# Patient Record
Sex: FEMALE
[De-identification: names, ages, dates, MRNs, and addresses within clinical notes are randomized per-mention and may not be internally consistent; named-entity substitution may affect disease eponyms.]

---

## 2023-09-19 ENCOUNTER — APPOINTMENT (OUTPATIENT)
Dept: PAIN MANAGEMENT | Facility: CLINIC | Age: 68
End: 2023-09-19
Payer: MEDICARE

## 2023-09-19 VITALS — WEIGHT: 170 LBS | HEIGHT: 65 IN | BODY MASS INDEX: 28.32 KG/M2

## 2023-09-19 DIAGNOSIS — Z86.39 PERSONAL HISTORY OF OTHER ENDOCRINE, NUTRITIONAL AND METABOLIC DISEASE: ICD-10-CM

## 2023-09-19 DIAGNOSIS — M25.511 PAIN IN RIGHT SHOULDER: ICD-10-CM

## 2023-09-19 DIAGNOSIS — M25.512 PAIN IN RIGHT SHOULDER: ICD-10-CM

## 2023-09-19 PROBLEM — Z00.00 ENCOUNTER FOR PREVENTIVE HEALTH EXAMINATION: Status: ACTIVE | Noted: 2023-09-19

## 2023-09-19 PROCEDURE — 99203 OFFICE O/P NEW LOW 30 MIN: CPT

## 2023-09-19 RX ORDER — GABAPENTIN 300 MG/1
300 CAPSULE ORAL 3 TIMES DAILY
Qty: 90 | Refills: 0 | Status: ACTIVE | COMMUNITY
Start: 2023-09-19 | End: 1900-01-01

## 2023-09-27 ENCOUNTER — APPOINTMENT (OUTPATIENT)
Dept: MRI IMAGING | Facility: CLINIC | Age: 68
End: 2023-09-27
Payer: MEDICARE

## 2023-09-27 PROCEDURE — 72148 MRI LUMBAR SPINE W/O DYE: CPT

## 2023-10-03 ENCOUNTER — APPOINTMENT (OUTPATIENT)
Dept: PAIN MANAGEMENT | Facility: CLINIC | Age: 68
End: 2023-10-03
Payer: MEDICARE

## 2023-10-03 VITALS — BODY MASS INDEX: 28.32 KG/M2 | HEIGHT: 65 IN | WEIGHT: 170 LBS

## 2023-10-03 DIAGNOSIS — M54.17 RADICULOPATHY, LUMBOSACRAL REGION: ICD-10-CM

## 2023-10-03 PROCEDURE — 99213 OFFICE O/P EST LOW 20 MIN: CPT

## 2023-12-05 ENCOUNTER — APPOINTMENT (OUTPATIENT)
Dept: PAIN MANAGEMENT | Facility: CLINIC | Age: 68
End: 2023-12-05
Payer: MEDICARE

## 2023-12-05 VITALS — HEIGHT: 65 IN | WEIGHT: 170 LBS | BODY MASS INDEX: 28.32 KG/M2

## 2023-12-05 DIAGNOSIS — M19.012 PRIMARY OSTEOARTHRITIS, RIGHT SHOULDER: ICD-10-CM

## 2023-12-05 DIAGNOSIS — M19.011 PRIMARY OSTEOARTHRITIS, RIGHT SHOULDER: ICD-10-CM

## 2023-12-05 DIAGNOSIS — M25.819 OTHER SPECIFIED JOINT DISORDERS, UNSPECIFIED SHOULDER: ICD-10-CM

## 2023-12-05 PROCEDURE — 99213 OFFICE O/P EST LOW 20 MIN: CPT

## 2023-12-05 RX ORDER — MELOXICAM 15 MG/1
15 TABLET ORAL
Qty: 30 | Refills: 1 | Status: ACTIVE | COMMUNITY
Start: 2023-12-05 | End: 1900-01-01

## 2025-06-19 ENCOUNTER — INPATIENT (INPATIENT)
Facility: HOSPITAL | Age: 70
LOS: 4 days | Discharge: HOME HEALTH | End: 2025-06-24
Attending: STUDENT IN AN ORGANIZED HEALTH CARE EDUCATION/TRAINING PROGRAM | Admitting: STUDENT IN AN ORGANIZED HEALTH CARE EDUCATION/TRAINING PROGRAM
Payer: MEDICARE

## 2025-06-19 VITALS
RESPIRATION RATE: 16 BRPM | TEMPERATURE: 98 F | SYSTOLIC BLOOD PRESSURE: 153 MMHG | WEIGHT: 164.91 LBS | HEIGHT: 65 IN | OXYGEN SATURATION: 100 % | HEART RATE: 82 BPM | DIASTOLIC BLOOD PRESSURE: 73 MMHG

## 2025-06-19 LAB
APTT BLD: 29.9 SEC — SIGNIFICANT CHANGE UP (ref 26.1–36.8)
INR BLD: 0.97 RATIO — SIGNIFICANT CHANGE UP (ref 0.85–1.16)
PROTHROM AB SERPL-ACNC: 11.2 SEC — SIGNIFICANT CHANGE UP (ref 9.9–13.4)

## 2025-06-19 RX ORDER — ACETAMINOPHEN 500 MG/5ML
1000 LIQUID (ML) ORAL ONCE
Refills: 0 | Status: COMPLETED | OUTPATIENT
Start: 2025-06-19 | End: 2025-06-19

## 2025-06-19 RX ORDER — MAGNESIUM, ALUMINUM HYDROXIDE 200-200 MG
30 TABLET,CHEWABLE ORAL ONCE
Refills: 0 | Status: COMPLETED | OUTPATIENT
Start: 2025-06-19 | End: 2025-06-19

## 2025-06-19 RX ADMIN — Medication 30 MILLILITER(S): at 23:16

## 2025-06-19 RX ADMIN — Medication 400 MILLIGRAM(S): at 23:21

## 2025-06-19 RX ADMIN — Medication 20 MILLIGRAM(S): at 23:16

## 2025-06-19 NOTE — ED ADULT NURSE NOTE - OBJECTIVE STATEMENT
Pt arrives to room 27, A&Ox4, ambulatory. PMHx of DM2, HTN, HLD. Pt C/O CP, SOB worse upon ambulation, HA, and right sided body pain x months. Respirations are even and unlabored, NSR on monitor, capillary refill is 3 seconds bilaterally. 20G IV placed in right AC, labs sent. medicated as per MD ordered. bed in lowest position, comfort measures provided, safety maintained.

## 2025-06-19 NOTE — ED PROVIDER NOTE - PHYSICAL EXAMINATION
Const: Awake, alert, no acute distress.  Well appearing.  Moving comfortably on stretcher.  HEENT: NC/AT.  Moist mucous membranes.  No pharyngeal erythema, no exudates.  Eyes: Extraocular movements intact b/l.  Pupils equal, round, and reactive to light b/l.  Conjunctiva pink.  No scleral icterus.  Neck: Neck supple, full ROM without pain.  Cardiac: Regular rate and regular rhythm. S1 S2 present.  Peripheral pulses 2+ and symmetric.  No LE edema.  No chest wall tenderness, no overlying skin changes.  Resp: Speaking in full sentences. No evidence of respiratory distress.  Good air entry b/l, breath sounds clear to auscultation b/l.  Abd: Non-distended, no overlying skin changes.  Soft, (+) mild RUQ tenderness.  no guarding, no rigidity, no rebound tenderness.  No palpable masses.  MSK: Spine midline and non-tender, no paraspinal tenderness, no palpable deformities or overlying skin changes or open wounds. No CVAT.  Skin: Normal coloration.  No rashes, abrasions or lacerations.  Neuro: Awake, alert & oriented x 3.  Moves all extremities spontaneously and symmetrically.  No focal deficits.

## 2025-06-19 NOTE — ED ADULT TRIAGE NOTE - BEFAST SCREENING
Subjective   Patient ID: Valerie is a 43 year old female.    Chief Complaint   Patient presents with    Office Visit    Back Pain     For the last 3 days she has had low back pain that radiates to the left side under rib cage. She sees a pulmonologist for a left lung issue. The pain makes her lightheaded.      HPI  43 year old year-old female who presents with chief complaints of bilateral flank pain and mid back pain with pain wrapping around into her left upper quadrant area underneath the left rib cage.    Past Medical History:   Diagnosis Date    Asthma (CMD)     Hernia, inguinal     Hernia, umbilical     Influenza with pneumonia 11/2022       MEDICATIONS:  Current Outpatient Medications   Medication Sig    albuterol 108 (90 Base) MCG/ACT inhaler Inhale 2 puffs into the lungs every 4 hours as needed for Shortness of Breath or Wheezing.    acetaminophen (TYLENOL) 325 MG tablet Take 2 tablets by mouth every 6 hours as needed for Pain. (Patient not taking: Reported on 9/18/2024)    MAGNESIUM OXIDE PO STOP NOW (Patient not taking: Reported on 11/25/2024)    BLACK CURRANT SEED OIL PO STOP NOW (Patient not taking: Reported on 11/25/2024)    VITAMIN D PO Pt instructed to stop now (Patient not taking: Reported on 11/25/2024)    BIOTIN PO Take by mouth daily. Pt instructed to stop now (Patient not taking: Reported on 11/25/2024)     No current facility-administered medications for this visit.       ALLERGIES:  ALLERGIES:  No Known Allergies    PAST SURGICAL HISTORY:  Past Surgical History:   Procedure Laterality Date    Appendectomy      Esophagogastroduodenoscopy transoral flex diag      Open access colonoscopy         FAMILY HISTORY:  No family history on file.    SOCIAL HISTORY:  Social History     Tobacco Use    Smoking status: Never     Passive exposure: Never    Smokeless tobacco: Never   Vaping Use    Vaping status: never used   Substance Use Topics    Alcohol use: Not Currently    Drug use: Never          Patient's medications, allergies, past medical, surgical, and social history including nursing input notes were reviewed and updated as appropriate.    Review of Systems   Constitutional: Negative.    HENT: Negative.     Eyes: Negative.    Respiratory: Negative.     Cardiovascular: Negative.    Gastrointestinal:  Positive for abdominal pain.   Endocrine: Negative.    Genitourinary:  Positive for flank pain.   Musculoskeletal:  Positive for back pain.   Skin: Negative.    Neurological:  Positive for light-headedness.   Hematological: Negative.    All other systems reviewed and are negative.    See HPI for pertinent positives, otherwise all other review of system are negative     Objective   Physical Exam  Vitals and nursing note reviewed.   Constitutional:       Appearance: Normal appearance.   HENT:      Head: Normocephalic and atraumatic.      Right Ear: Tympanic membrane, ear canal and external ear normal.      Left Ear: Tympanic membrane, ear canal and external ear normal.      Nose: Nose normal.      Mouth/Throat:      Mouth: Mucous membranes are moist.      Pharynx: Oropharynx is clear.   Eyes:      Extraocular Movements: Extraocular movements intact.      Conjunctiva/sclera: Conjunctivae normal.      Pupils: Pupils are equal, round, and reactive to light.   Cardiovascular:      Rate and Rhythm: Normal rate and regular rhythm.      Pulses: Normal pulses.      Heart sounds: Normal heart sounds.   Pulmonary:      Effort: Pulmonary effort is normal.      Breath sounds: Normal breath sounds.   Abdominal:      General: Abdomen is flat. Bowel sounds are normal.      Palpations: Abdomen is soft.      Tenderness: There is abdominal tenderness in the left upper quadrant. There is no right CVA tenderness or left CVA tenderness.      Comments: Bilateral flank tenderness.   Musculoskeletal:         General: Normal range of motion.   Skin:     General: Skin is warm and dry.   Neurological:      General: No focal  deficit present.      Mental Status: She is alert and oriented to person, place, and time. Mental status is at baseline.   Psychiatric:         Mood and Affect: Mood normal.         Behavior: Behavior normal.         Thought Content: Thought content normal.         Judgment: Judgment normal.         Visit Vitals  /79   Pulse 88   Temp 96.4 °F (35.8 °C)   Resp 16   LMP 08/21/2023 (Exact Date)   SpO2 99%         Assessment   Problem List Items Addressed This Visit    None  Visit Diagnoses       Bilateral flank pain    -  Primary    Abdominal pain, LUQ (left upper quadrant)        Relevant Orders    POCT Urine Dip Auto (Completed)            Results for orders placed or performed in visit on 11/25/24   POCT Urine Dip Auto   Result Value Ref Range    POCT Color Yellow Patricia, Brown, Orange, Pink, Red, Straw, Yellow, Green, Blue, Colorless, Other    POCT Appearance Clear Clear, Cloudy, Hazy, Turbid    POCT Glucose Urine Negative Negative mg/dL    POCT Bilirubin Negative Negative    POCT Ketones Negative Negative mg/dL    POCT Specific Gravity 1.020 1.000, 1.005, 1.010, 1.015, 1.020, 1.025, 1.030, <= 1.005    POCT Occult Blood Negative Negative    POCT pH 7.0 5.0, 5.5, 6.0, 6.5, 7.0, 7.5, 8.0, 8.5    POCT Protein Negative Negative mg/dL    POCT Urobilinogen 0.2 0.2, 1.0 mg/dL    Urine Nitrite Negative Negative    WBC (Leukocyte) Esterase POC Negative Negative        MEDICAL DECISION MAKING:   [facility]      This is a 43 year old year-old female who presents with chief complaints of bilateral flank pain and mid back pain with pain wrapping around into her left upper quadrant area underneath the left rib cage..  The symptoms have been going on for the last 3 days..  Patient complains of fatigue and tiredness.  She also reports feeling lightheaded and dizzy as well.  She denies any fever or chills.  Denies any chest pain or shortness of breath.  She also reports some chest congestion described as chest tightness.  But denies  any significant cough or phlegm.  Denies any shortness of breath.  She denies any urinary symptoms of dysuria frequency urgency hematuria.  Patient cannot recall any specific etiology.  Denies any back injury.  Denies any abdominal injury.  She does have a history of asthma.  She has a history of tubal ligation as well.  Vitals are stable  On exam  no acute distress.  No ill appearance.  Patient does not appear to be in pain.  Lungs are clear to auscultation.  No tachycardia.  Patient exhibits tenderness in the left upper quadrant and the suprapubic area.  There is no obvious CVA tenderness bilaterally.  No significant thoracolumbar muscle spasms.  UA: Negative for white blood cells, negative nitrate, negative protein, negative blood, clear in appearance  Based on patient's clinical presentation she likely needs a CT of the abdomen pelvis with contrast to further investigate her flank pain and abdominal pain.  Due to lack of CT at immediate care recommend further evaluation at the emergency room.  Patient plans to go to [facility].  She stable to go by car.  Patient was discharged from clinic in stable condition  Instructions provided as documented in the AVS.     Negative

## 2025-06-19 NOTE — ED PROVIDER NOTE - ATTENDING CONTRIBUTION TO CARE
69 y/o F with h/o HTN, DM, hyperlipidemia, CAD s/p ?stents x 2 p/w exertional SOB and CP x 6 months.  Never sxs at rest, but reports persistent and worsening exertional sxs.  She has been following with her outpatient PMH Dr Olivares, but has never reported these sxs.  No fever, chills, back pain, cough, uri sxs, leg pain or swelling, n/v, abd pain.    Well appearing, lying comfortably in stretcher, awake and alert, nontoxic.  AF/VSS.  Lungs cta bl.  Cards nl S1/S2, RRR, no MRG.  Abd soft ntnd.  No pedal edema or calf tenderness.    High risk cardiac pt with sxs concerning for angina - no sxs at rest or currently.  Will obtain ekg, labs, cxr, cdu vs admission for provocative testing and cards eval.

## 2025-06-19 NOTE — ED PROVIDER NOTE - CLINICAL SUMMARY MEDICAL DECISION MAKING FREE TEXT BOX
Jun LYNN), PGY-2:   This is a 70-year-old F patient with history HTN, HLD, DM, CAD with stents, on baby aspirin, presenting to the ED for evaluation of chest pain beginning since months ago.  Patient describes approximately 6 months of intermittent central chest pain, occurring mostly during ambulation, worsening over the past few months, with associated nausea and shortness of breath, also with pain radiating to her right arm occasionally.  Patient follows with cardiology Dr. Olivares, last seen 6 months ago.  Denies fevers, chills, vomiting, diarrhea, LOC, cough, throat pain, nasal congestion, dysuria, hematuria, urinary frequency, diaphoresis.    Patient well-appearing in the ER, vital signs stable and nonactionable in triage, mild right upper quadrant tenderness on exam, no chest wall tenderness, otherwise benign exam, concern for ACS versus gallbladder pathology, less likely MSK, will check basic blood work, EKG and troponin, ultrasound right upper quadrant, chest x-ray, pain control and antacids as needed, follow-up results and reassess, patient may require admission for continued cardiology evaluation given nature of symptoms consistent with unstable angina. Jun LYNN), PGY-2:   This is a 70-year-old F patient with history HTN, HLD, DM, CAD with stents, on baby aspirin, presenting to the ED for evaluation of chest pain beginning since months ago.  Patient describes approximately 6 months of intermittent central chest pain, occurring mostly during ambulation, worsening over the past few months, with associated nausea and shortness of breath, also with pain radiating to her right arm occasionally.  Patient follows with cardiology Dr. Olivares, last seen 6 months ago.  Denies fevers, chills, vomiting, diarrhea, LOC, cough, throat pain, nasal congestion, dysuria, hematuria, urinary frequency, diaphoresis.    Patient well-appearing in the ER, vital signs stable and nonactionable in triage, mild right upper quadrant tenderness on exam, no chest wall tenderness, otherwise benign exam, concern for ACS versus gallbladder pathology, less likely MSK, will check basic blood work, EKG and troponin, ultrasound right upper quadrant, chest x-ray, pain control and antacids as needed, follow-up results and reassess, patient may require admission for continued cardiology evaluation given nature of symptoms consistent with unstable angina.  Pt's primary language is not english, official medical translation services were offered however pt preferred to communicate in english and with the help of grandson present at the bedside to assist in translation.

## 2025-06-19 NOTE — ED ADULT TRIAGE NOTE - CHIEF COMPLAINT QUOTE
pt c/o chest pain, sob worse when walking, headache, right sided body pain x months. Hx. HTN. HLD. DM2. pt well appearing.

## 2025-06-20 DIAGNOSIS — R07.9 CHEST PAIN, UNSPECIFIED: ICD-10-CM

## 2025-06-20 DIAGNOSIS — Z95.5 PRESENCE OF CORONARY ANGIOPLASTY IMPLANT AND GRAFT: Chronic | ICD-10-CM

## 2025-06-20 LAB
ADD ON TEST-SPECIMEN IN LAB: SIGNIFICANT CHANGE UP
ALBUMIN SERPL ELPH-MCNC: 4 G/DL — SIGNIFICANT CHANGE UP (ref 3.3–5)
ALP SERPL-CCNC: 116 U/L — SIGNIFICANT CHANGE UP (ref 40–120)
ALT FLD-CCNC: 20 U/L — SIGNIFICANT CHANGE UP (ref 4–33)
ANION GAP SERPL CALC-SCNC: 13 MMOL/L — SIGNIFICANT CHANGE UP (ref 7–14)
ANISOCYTOSIS BLD QL: SLIGHT — SIGNIFICANT CHANGE UP
AST SERPL-CCNC: 27 U/L — SIGNIFICANT CHANGE UP (ref 4–32)
BASOPHILS # BLD AUTO: 0.01 K/UL — SIGNIFICANT CHANGE UP (ref 0–0.2)
BASOPHILS # BLD MANUAL: 0.07 K/UL — SIGNIFICANT CHANGE UP (ref 0–0.2)
BASOPHILS NFR BLD AUTO: 0.1 % — SIGNIFICANT CHANGE UP (ref 0–2)
BASOPHILS NFR BLD MANUAL: 0.9 % — SIGNIFICANT CHANGE UP (ref 0–2)
BILIRUB SERPL-MCNC: 0.2 MG/DL — SIGNIFICANT CHANGE UP (ref 0.2–1.2)
BUN SERPL-MCNC: 19 MG/DL — SIGNIFICANT CHANGE UP (ref 7–23)
CALCIUM SERPL-MCNC: 9.3 MG/DL — SIGNIFICANT CHANGE UP (ref 8.4–10.5)
CHLORIDE SERPL-SCNC: 101 MMOL/L — SIGNIFICANT CHANGE UP (ref 98–107)
CHOLEST SERPL-MCNC: 125 MG/DL — SIGNIFICANT CHANGE UP
CO2 SERPL-SCNC: 22 MMOL/L — SIGNIFICANT CHANGE UP (ref 22–31)
CREAT SERPL-MCNC: 0.76 MG/DL — SIGNIFICANT CHANGE UP (ref 0.5–1.3)
CRP SERPL-MCNC: <3 MG/L — SIGNIFICANT CHANGE UP
D DIMER BLD IA.RAPID-MCNC: <150 NG/ML DDU — SIGNIFICANT CHANGE UP
EGFR: 84 ML/MIN/1.73M2 — SIGNIFICANT CHANGE UP
EGFR: 84 ML/MIN/1.73M2 — SIGNIFICANT CHANGE UP
EOSINOPHIL # BLD AUTO: 0.05 K/UL — SIGNIFICANT CHANGE UP (ref 0–0.5)
EOSINOPHIL # BLD MANUAL: 0.13 K/UL — SIGNIFICANT CHANGE UP (ref 0–0.5)
EOSINOPHIL NFR BLD AUTO: 0.6 % — SIGNIFICANT CHANGE UP (ref 0–6)
EOSINOPHIL NFR BLD MANUAL: 1.7 % — SIGNIFICANT CHANGE UP (ref 0–6)
ERYTHROCYTE [SEDIMENTATION RATE] IN BLOOD: 13 MM/HR — SIGNIFICANT CHANGE UP (ref 0–20)
GIANT PLATELETS BLD QL SMEAR: PRESENT
GLUCOSE BLDC GLUCOMTR-MCNC: 109 MG/DL — HIGH (ref 70–99)
GLUCOSE BLDC GLUCOMTR-MCNC: 151 MG/DL — HIGH (ref 70–99)
GLUCOSE BLDC GLUCOMTR-MCNC: 164 MG/DL — HIGH (ref 70–99)
GLUCOSE SERPL-MCNC: 125 MG/DL — HIGH (ref 70–99)
HCT VFR BLD CALC: 33.1 % — LOW (ref 34.5–45)
HDLC SERPL-MCNC: 47 MG/DL — LOW
HGB BLD-MCNC: 10.2 G/DL — LOW (ref 11.5–15.5)
IMM GRANULOCYTES # BLD AUTO: 0.05 K/UL — SIGNIFICANT CHANGE UP (ref 0–0.07)
IMM GRANULOCYTES NFR BLD AUTO: 0.6 % — SIGNIFICANT CHANGE UP (ref 0–0.9)
LDLC SERPL-MCNC: 56 MG/DL — SIGNIFICANT CHANGE UP
LIDOCAIN IGE QN: 81 U/L — HIGH (ref 7–60)
LIPID PNL WITH DIRECT LDL SERPL: 56 MG/DL — SIGNIFICANT CHANGE UP
LYMPHOCYTES # BLD AUTO: 2.45 K/UL — SIGNIFICANT CHANGE UP (ref 1–3.3)
LYMPHOCYTES # BLD MANUAL: 2.07 K/UL — SIGNIFICANT CHANGE UP (ref 1–3.3)
LYMPHOCYTES NFR BLD AUTO: 30.9 % — SIGNIFICANT CHANGE UP (ref 13–44)
LYMPHOCYTES NFR BLD MANUAL: 26.1 % — SIGNIFICANT CHANGE UP (ref 13–44)
MCHC RBC-ENTMCNC: 23.1 PG — LOW (ref 27–34)
MCHC RBC-ENTMCNC: 30.8 G/DL — LOW (ref 32–36)
MCV RBC AUTO: 75.1 FL — LOW (ref 80–100)
MICROCYTES BLD QL: SLIGHT — SIGNIFICANT CHANGE UP
MONOCYTES # BLD AUTO: 0.48 K/UL — SIGNIFICANT CHANGE UP (ref 0–0.9)
MONOCYTES # BLD MANUAL: 0.34 K/UL — SIGNIFICANT CHANGE UP (ref 0–0.9)
MONOCYTES NFR BLD AUTO: 6 % — SIGNIFICANT CHANGE UP (ref 2–14)
MONOCYTES NFR BLD MANUAL: 4.3 % — SIGNIFICANT CHANGE UP (ref 2–14)
NEUTROPHILS # BLD AUTO: 4.9 K/UL — SIGNIFICANT CHANGE UP (ref 1.8–7.4)
NEUTROPHILS # BLD MANUAL: 5.32 K/UL — SIGNIFICANT CHANGE UP (ref 1.8–7.4)
NEUTROPHILS NFR BLD AUTO: 61.8 % — SIGNIFICANT CHANGE UP (ref 43–77)
NEUTROPHILS NFR BLD MANUAL: 67 % — SIGNIFICANT CHANGE UP (ref 43–77)
NONHDLC SERPL-MCNC: 78 MG/DL — SIGNIFICANT CHANGE UP
NRBC # BLD AUTO: 0 K/UL — SIGNIFICANT CHANGE UP (ref 0–0)
NRBC # FLD: 0 K/UL — SIGNIFICANT CHANGE UP (ref 0–0)
PLAT MORPH BLD: NORMAL — SIGNIFICANT CHANGE UP
PLATELET # BLD AUTO: 149 K/UL — LOW (ref 150–400)
PLATELET COUNT - ESTIMATE: NORMAL — SIGNIFICANT CHANGE UP
PMV BLD: SIGNIFICANT CHANGE UP FL (ref 7–13)
POTASSIUM SERPL-MCNC: 4.3 MMOL/L — SIGNIFICANT CHANGE UP (ref 3.5–5.3)
POTASSIUM SERPL-SCNC: 4.3 MMOL/L — SIGNIFICANT CHANGE UP (ref 3.5–5.3)
PROT SERPL-MCNC: 7 G/DL — SIGNIFICANT CHANGE UP (ref 6–8.3)
RBC # BLD: 4.41 M/UL — SIGNIFICANT CHANGE UP (ref 3.8–5.2)
RBC # FLD: 16 % — HIGH (ref 10.3–14.5)
RBC BLD AUTO: NORMAL — SIGNIFICANT CHANGE UP
SMUDGE CELLS # BLD: PRESENT
SODIUM SERPL-SCNC: 136 MMOL/L — SIGNIFICANT CHANGE UP (ref 135–145)
TRIGL SERPL-MCNC: 120 MG/DL — SIGNIFICANT CHANGE UP
TROPONIN T, HIGH SENSITIVITY RESULT: <6 NG/L — SIGNIFICANT CHANGE UP
TROPONIN T, HIGH SENSITIVITY RESULT: <6 NG/L — SIGNIFICANT CHANGE UP
WBC # BLD: 7.94 K/UL — SIGNIFICANT CHANGE UP (ref 3.8–10.5)
WBC # FLD AUTO: 7.94 K/UL — SIGNIFICANT CHANGE UP (ref 3.8–10.5)

## 2025-06-20 RX ORDER — SODIUM CHLORIDE 9 G/1000ML
1000 INJECTION, SOLUTION INTRAVENOUS
Refills: 0 | Status: DISCONTINUED | OUTPATIENT
Start: 2025-06-20 | End: 2025-06-24

## 2025-06-20 RX ORDER — ASPIRIN 325 MG
324 TABLET ORAL ONCE
Refills: 0 | Status: COMPLETED | OUTPATIENT
Start: 2025-06-20 | End: 2025-06-20

## 2025-06-20 RX ORDER — ATORVASTATIN CALCIUM 80 MG/1
10 TABLET, FILM COATED ORAL AT BEDTIME
Refills: 0 | Status: DISCONTINUED | OUTPATIENT
Start: 2025-06-20 | End: 2025-06-24

## 2025-06-20 RX ORDER — SITAGLIPTIN AND METFORMIN HYDROCHLORIDE 1000; 50 MG/1; MG/1
1 TABLET, FILM COATED ORAL
Refills: 0 | DISCHARGE

## 2025-06-20 RX ORDER — DEXTROSE 50 % IN WATER 50 %
12.5 SYRINGE (ML) INTRAVENOUS ONCE
Refills: 0 | Status: DISCONTINUED | OUTPATIENT
Start: 2025-06-20 | End: 2025-06-24

## 2025-06-20 RX ORDER — DEXTROSE 50 % IN WATER 50 %
25 SYRINGE (ML) INTRAVENOUS ONCE
Refills: 0 | Status: DISCONTINUED | OUTPATIENT
Start: 2025-06-20 | End: 2025-06-24

## 2025-06-20 RX ORDER — OMEPRAZOLE 20 MG/1
1 CAPSULE, DELAYED RELEASE ORAL
Refills: 0 | DISCHARGE

## 2025-06-20 RX ORDER — INSULIN LISPRO 100 U/ML
INJECTION, SOLUTION INTRAVENOUS; SUBCUTANEOUS AT BEDTIME
Refills: 0 | Status: DISCONTINUED | OUTPATIENT
Start: 2025-06-20 | End: 2025-06-24

## 2025-06-20 RX ORDER — PREGABALIN 50 MG/1
75 CAPSULE ORAL DAILY
Refills: 0 | Status: DISCONTINUED | OUTPATIENT
Start: 2025-06-20 | End: 2025-06-24

## 2025-06-20 RX ORDER — INSULIN LISPRO 100 U/ML
INJECTION, SOLUTION INTRAVENOUS; SUBCUTANEOUS
Refills: 0 | Status: DISCONTINUED | OUTPATIENT
Start: 2025-06-20 | End: 2025-06-24

## 2025-06-20 RX ORDER — GLUCAGON 3 MG/1
1 POWDER NASAL ONCE
Refills: 0 | Status: DISCONTINUED | OUTPATIENT
Start: 2025-06-20 | End: 2025-06-24

## 2025-06-20 RX ORDER — PREGABALIN 50 MG/1
1 CAPSULE ORAL
Refills: 0 | DISCHARGE

## 2025-06-20 RX ORDER — DEXTROSE 50 % IN WATER 50 %
15 SYRINGE (ML) INTRAVENOUS ONCE
Refills: 0 | Status: DISCONTINUED | OUTPATIENT
Start: 2025-06-20 | End: 2025-06-24

## 2025-06-20 RX ORDER — ASPIRIN 325 MG
81 TABLET ORAL DAILY
Refills: 0 | Status: DISCONTINUED | OUTPATIENT
Start: 2025-06-20 | End: 2025-06-24

## 2025-06-20 RX ORDER — LANOLIN/MINERAL OIL/PETROLATUM
1 OINTMENT (GRAM) OPHTHALMIC (EYE)
Refills: 0 | Status: DISCONTINUED | OUTPATIENT
Start: 2025-06-20 | End: 2025-06-24

## 2025-06-20 RX ORDER — ENOXAPARIN SODIUM 100 MG/ML
40 INJECTION SUBCUTANEOUS EVERY 24 HOURS
Refills: 0 | Status: DISCONTINUED | OUTPATIENT
Start: 2025-06-20 | End: 2025-06-22

## 2025-06-20 RX ORDER — ASPIRIN 325 MG
1 TABLET ORAL
Refills: 0 | DISCHARGE

## 2025-06-20 RX ORDER — LANOLIN/MINERAL OIL/PETROLATUM
1 OINTMENT (GRAM) OPHTHALMIC (EYE)
Refills: 0 | DISCHARGE

## 2025-06-20 RX ORDER — ACETAMINOPHEN 500 MG/5ML
650 LIQUID (ML) ORAL EVERY 6 HOURS
Refills: 0 | Status: DISCONTINUED | OUTPATIENT
Start: 2025-06-20 | End: 2025-06-24

## 2025-06-20 RX ADMIN — Medication 324 MILLIGRAM(S): at 04:02

## 2025-06-20 NOTE — H&P ADULT - NSHPLABSRESULTS_GEN_ALL_CORE
10.2   7.94  )-----------( 149      ( 19 Jun 2025 23:09 )             33.1     136  |  101  |  19  ----------------------------<  125[H]     06-19  4.3   |  22  |  0.76    Ca    9.3      19 Jun 2025 23:09    TPro  7.0  /  Alb  4.0  /  TBili  0.2  /  DBili  x   /  AST  27  /  ALT  20  /  AlkPhos  116  06-19

## 2025-06-20 NOTE — H&P ADULT - NSHPREVIEWOFSYSTEMS_GEN_ALL_CORE
- CONSTITUTIONAL: Denies weight loss, fever and chills.  - HEENT: Denies changes in vision and hearing.  - RESPIRATORY: As per HPI  - CV: As per HPI  - GI: Denies abdominal pain, nausea, vomiting and diarrhea.  - : Denies dysuria and urinary frequency.  - MSK: Denies myalgia and joint pain.  - SKIN: Denies rash and pruritus.  - NEUROLOGICAL: Denies headache and syncope.  - PSYCHIATRIC: Denies recent changes in mood. Denies anxiety and depression.    A 12-point review of systems was completed and is otherwise negative except as noted in the HPI.

## 2025-06-20 NOTE — H&P ADULT - TIME BILLING
I have spent 78 minutes of time on the encounter which excludes teaching and separately reported services.    Preparing to see the patient including review of tests and other providers' notes, confirming history with patient/family member, performing medical examination and evaluation, counseling and educating the patient/family/caregiver, ordering medications, tests and procedures, communicating with other health care professionals, documenting clinical information in the EMR, independently interpreting results and communicating results to the patient/family/caregiver, care coordination

## 2025-06-20 NOTE — ED CDU PROVIDER INITIAL DAY NOTE - CLINICAL SUMMARY MEDICAL DECISION MAKING FREE TEXT BOX
70-year-old F patient with history HTN, HLD, DM, CAD with stents, on baby aspirin, presenting to the ED for evaluation of chest pain beginning since months ago.  Patient describes approximately 6 months of intermittent central chest pain, occurring mostly during ambulation, worsening over the past few months, with associated nausea and shortness of breath, also with pain radiating to her right arm occasionally.  Patient follows with cardiology Dr. Olivares, last seen 6 months ago.  Denies fevers, chills, vomiting, diarrhea, LOC, cough, throat pain, nasal congestion, dysuria, hematuria, urinary frequency, diaphoresis.    In the ED, VSS; EKG/labs/CXR/RUQ US with no emergent findings.  Pt was sent to CDU for continued care plan:  Tele monitoring, stress test, echo, Tele Doc of Day cardiologist evaluation, general observation care / monitoring.

## 2025-06-20 NOTE — CONSULT NOTE ADULT - SUBJECTIVE AND OBJECTIVE BOX
[facility] Attending Consultation Note     Patient seen and evaluated at bedside    Chief Complaint:    HPI:    70F w/ HTN, HLD, T2DM, hx of CAD, here for chest pain and dyspnea. Cardiology consulted in the ER for cp and dyspnea episode. EKG reviewed, NSR no sig STT changes.     PMHx:   Hyperlipidemia    Hypertension    Diabetes    CAD (coronary artery disease)        PSHx:       Allergies:  No Known Allergies      Home Meds:    Current Medications:       FAMILY HISTORY:      Social History: Personally reviewed   No tobacco, EtOH or IVDU     REVIEW OF SYSTEMS:  Constitutional:     [x ] negative [ ] fevers [ ] chills [ ] weight loss [ ] weight gain  HEENT:                  [x ] negative [ ] dry eyes [ ] eye irritation [ ] postnasal drip [ ] nasal congestion  CV:                         [ x] negative  [ ] chest pain [ ] orthopnea [ ] palpitations [ ] murmur  Resp:                     [x ] negative [ ] cough [ ] shortness of breath [ ] dyspnea [ ] wheezing [ ] sputum [ ]hemoptysis  GI:                          [ x] negative [ ] nausea [ ] vomiting [ ] diarrhea [ ] constipation [ ] abd pain [ ] dysphagia   :                        [ x] negative [ ] dysuria [ ] nocturia [ ] hematuria [ ] increased urinary frequency  Musculoskeletal: [x ] negative [ ] back pain [ ] myalgias [ ] arthralgias [ ] fracture  Skin:                       [ x] negative [ ] rash [ ] itch  Neurological:        [ x] negative [ ] headache [ ] dizziness [ ] syncope [ ] weakness [ ] numbness  Psychiatric:           [ x] negative [ ] anxiety [ ] depression  Endocrine:            [ x] negative [ ] diabetes [ ] thyroid problem  Heme/Lymph:      [ x] negative [ ] anemia [ ] bleeding problem  Allergic/Immune: [ x] negative [ ] itchy eyes [ ] nasal discharge [ ] hives [ ] angioedema    [ x] All other systems negative  [ ] Unable to assess ROS due to      Physical Exam:  T(F): 98.1 (-), Max: 98.3 (-)  HR: 69 (-) (69 - 82)  BP: 150/55 (-) (148/69 - 176/80)  RR: 16 (-20)  SpO2: 98% (-)    Gen: Well appearing   HENNT: No JVP   CV: regular rate, regular rhtyhm, no murmur   Pulm: clear to auscultation bilaterally   Abdomen: Non-tender, non-distended   Ext: no edema b/l   Neuro: grossly non-focal     Cardiovascular Diagnostic Testing:      Labs: Personally reviewed                        10.2   7.94  )-----------( 149      ( 2025 23:09 )             33.1         136  |  101  |  19  ----------------------------<  125[H]  4.3   |  22  |  0.76    Ca    9.3      2025 23:09    TPro  7.0  /  Alb  4.0  /  TBili  0.2  /  DBili  x   /  AST  27  /  ALT  20  /  AlkPhos  116  -    PT/INR - ( 2025 23:09 )   PT: 11.2 sec;   INR: 0.97 ratio         PTT - ( 2025 23:09 )  PTT:29.9 sec    Total Cholesterol: 125  LDL: --  HDL: 47  T

## 2025-06-20 NOTE — H&P ADULT - HISTORY OF PRESENT ILLNESS
70F w/ PMHX of HTN, HLD, T2DM, CAD, here for chest pain and dyspnea since yesterday. Chest pain is located in the substernal region, provoked by exertion, and relieved by rest. It is characterized as sharp and radiating to left side. Denies any associated symptoms including fatigue, distress, diaphoresis, nausea, vomiting, abdominal pain, or leg swelling. Patient denies immobility, long plane/car rides, malignancy, or history of clotting disorder. No family history of CVD before the age of 65. No smoking history. No prior history of MI/PCI/CABG/CVA/TIA/PAD/AAA. No hx of hypothyroidism or HIV infection or Hepatitis C or autoimmune disorders.

## 2025-06-20 NOTE — H&P ADULT - PROBLEM SELECTOR PLAN 3
-Home medication:  Valsartan-HCTZ 80-12.5 mg QD     -c/w home meds with hold parameters (SBP <110, DBP <60)   -Monitor BP closely and adjust medications if clinically indicated  -DASH Diet

## 2025-06-20 NOTE — H&P ADULT - NSHPPHYSICALEXAM_GEN_ALL_CORE
- GENERAL: Alert and oriented x 3. No acute distress  - EYES: EOMI. Anicteric.  - HENT: Moist mucous membranes. No scleral icterus. No cervical lymphadenopathy.  - LUNGS: Clear to auscultation bilaterally. No accessory muscle use.  - CARDIOVASCULAR: Regular rate and rhythm. No murmur. No JVD.  - ABDOMEN: Soft, non-tender and non-distended. No palpable masses.  - EXTREMITIES: No edema. Non-tender.  - SKIN: No rashes or lesions. Warm.  - NEUROLOGIC: No focal neurological deficits. CN II-XII grossly intact, but not individually tested.  - PSYCHIATRIC: Cooperative. Appropriate mood and affect.

## 2025-06-20 NOTE — H&P ADULT - PROBLEM SELECTOR PLAN 1
::Typical chest pain without troponinemia; ECG with no ischemic changes; Hemodynamically stable. No evidence of volume overload or shock on exam. Low suspicion ACS, PE, PTX, aortic dissection, PNA, cardiac effusion or tamponade.  -Troponin: <6 x2  -CXR: No acute cardiopulmonary process.   -ESR 13, CRP <3  -TTE: LVEF 67%. Grade 1 DD. Mild MR.   -Admit to Telemetry; cardiac monitor  -s/p  mg x1   -ASA & Statin   -Repeat ECG in the AM  -TSH pending, A1c 6.8, LDL 56  -Avoid NSAIDs except for ASA to limit risk of cardiovascular events  -Cardiology consulted. Appreciate Recs  -Nuclear stress test: There are small-sized, mild to moderate defect(s) in the inferolateral and anteroseptal walls that are reversible suggestive of ischemia.   -Planned for cath as per cardiology

## 2025-06-20 NOTE — PATIENT PROFILE ADULT - IS THERE A SUSPICION OF ABUSE/NEGLIGENCE?
Future Appointments  Date Time Provider Department Center   7/18/2018 10:00 AM Kate Gil MD [facility] IM [place]   8/29/2018 4:00 PM Demetrius Asif MD [facility] NEURO [facility]     Patient refused HH referral. TN sent order for Rolling Walker to [facility], given permission to pull and delivery RW by Anthony Page.     07/10/18 1437   Final Note   Assessment Type Final Discharge Note   Discharge Disposition Home   Hospital Follow Up  Appt(s) scheduled? Yes   Discharge plans and expectations educations in teach back method with documentation complete? Yes   Right Care Referral Info   Post Acute Recommendation No Care     
no
No

## 2025-06-20 NOTE — ED ADULT NURSE REASSESSMENT NOTE - NS ED NURSE REASSESS COMMENT FT1
Received report from HUI browne. pt received in spot 26a. pt remains at baseline mental status, RR even unlabored completing full sentences. pt resting in stretcher comfortably at this time, no new complaints offered. stretcher lowest position siderails up safety measures in place. NSR on monitor. report given to CDU HIU carlos. labs drawn and sent. medicated per MD orders.

## 2025-06-20 NOTE — CONSULT NOTE ADULT - ASSESSMENT
70F w/ HTN, HLD, T2DM, hx of CAD, here for chest pain and dyspnea. Cardiology consulted in the ER for cp and dyspnea episode. EKG reviewed, NSR no sig STT changes.     -EKG NSR no sig STT changes. troponin negative. rec checking DDimer/ESR/CRP   -TTE ECHO, normal EF, diastolic dysfunction, mild VHD   -rec NST inpt to r/o significant CAD  -cont asa 81 mg qd   -monitor on tele     Joel Hannah MD [facility]  Attending Interventional Cardiologist, [facility].   Avaliable on [employer] Team

## 2025-06-20 NOTE — H&P ADULT - ASSESSMENT
70F w/ PMHX of HTN, HLD, T2DM, CAD, here for chest pain and dyspnea. NST notable for mild to moderate defects in the inferolateral and anteroseptal walls that are reversible suggestive of ischemia. Admitted to medicine for further management and monitoring. Detailed plan as below:

## 2025-06-20 NOTE — H&P ADULT - PROBLEM SELECTOR PLAN 7
VTE PPx: Lovenox  GI PPx: Pantoprazole  Nutrition: CC/DASH/TLC Diet  Fluids: PRN  Electrolytes: Maintain K>4, Mag >2, Phos > 3  Access: PIV  Code Status: FULL  Dispo: pending clinical improvement

## 2025-06-20 NOTE — ED CDU PROVIDER INITIAL DAY NOTE - OBJECTIVE STATEMENT
70-year-old F patient with history HTN, HLD, DM, CAD with stents, on baby aspirin, presenting to the ED for evaluation of chest pain beginning since months ago.  Patient describes approximately 6 months of intermittent central chest pain, occurring mostly during ambulation, worsening over the past few months, with associated nausea and shortness of breath, also with pain radiating to her right arm occasionally.  Patient follows with cardiology Dr. Olivares, last seen 6 months ago.  Denies fevers, chills, vomiting, diarrhea, LOC, cough, throat pain, nasal congestion, dysuria, hematuria, urinary frequency, diaphoresis.    CDU ELISEO Lazaro Note----  ED Provider HPI from MDM, as above.  In the ED, VSS; EKG/labs/CXR/RUQ US with no emergent findings.  Pt was sent to CDU for continued care plan:  Tele monitoring, stress test, echo, Tele Doc of Day cardiologist evaluation, general observation care / monitoring.

## 2025-06-20 NOTE — ED CDU PROVIDER INITIAL DAY NOTE - PHYSICAL EXAMINATION
CONSTITUTIONAL:  Well appearing, awake, alert, oriented to person, place, time/situation and in no apparent distress.  Pt. is objectively comfortable appearing and verbalizing in full, clear, effortless sentences.  ENMT: NC/AT.  Airway patent.  Nasal mucosa clear.  Moist mucous membranes.  Neck supple.  EYES:  Clear OU.  CARDIAC:  Normal rate, regular rhythm.  Heart sounds S1 S2.  No murmurs, gallops, or rubs.  RESPIRATORY:  Breath sounds clear and equal bilaterally.  No wheezes, no rales, no rhonchi.  GASTROINTESTINAL:  Abdomen soft, non-distended, non-tender.  No rebound, no guarding.  NEUROLOGICAL:  Alert and oriented to person/place/time/situation.  No focal deficits; no tremors noted.   MUSCULOSKELETAL:  Range of motion is not limited.    SKIN:  Skin color unremarkable.  Skin warm, dry.    PSYCHIATRIC:  Alert and oriented to person/place/time/situation.  Mood and affect WNL.  No apparent risk to self or others.

## 2025-06-20 NOTE — ED CDU PROVIDER INITIAL DAY NOTE - ATTENDING APP SHARED VISIT CONTRIBUTION OF CARE
69 y/o F with h/o HTN, DM, hyperlipidemia, CAD s/p ?stents x 2 p/w exertional SOB and CP x 6 months.  Never sxs at rest, but reports persistent and worsening exertional sxs.  She has been following with her outpatient PMH Dr Olivares, but has never reported these sxs.  No fever, chills, back pain, cough, uri sxs, leg pain or swelling, n/v, abd pain.    Well appearing, lying comfortably in stretcher, awake and alert, nontoxic.  AF/VSS.  Lungs cta bl.  Cards nl S1/S2, RRR, no MRG.  Abd soft ntnd.  No pedal edema or calf tenderness.    High risk cardiac pt with sxs concerning for angina - no sxs at rest or currently.  Initial labs/EKG/CXR without remarkable findings, will obs in CDU for tele monitoring, serial trop, echo/stress, cards eval.

## 2025-06-20 NOTE — ED CDU PROVIDER DISPOSITION NOTE - CLINICAL COURSE
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    [address] [place] [id number] [phone]              Contact Numbers     [facility]  Please call  [phone] to cancel and/or reschedule your appointment   Please call  [phone] with any problems or questions regarding your therapy.        January 2017 Details    Sun Mon Tue Wed Thu Fri Sat     1               2               3               4               5               6               7                 8               9               10               11               12               13               14                 15               16               17               18               19               20               21                 22               23               24               25               26               27               28                 29               30      7.5 mg   See details      31      5 mg              Date Details   01/30 This INR check               How to take your warfarin dose     To take:  5 mg Take 1 of the 5 mg tablets.    To take:  7.5 mg Take 1.5 of the 5 mg tablets.           February 2017 Details    Sun Mon Tue Wed Thu Fri Sat         1      7.5 mg         2      5 mg         3      7.5 mg         4      5 mg           5      5 mg         6      7.5 mg         7      5 mg         8      7.5 mg         9      5 mg         10      7.5 mg         11      5 mg           12      5 mg         13      7.5 mg         14      5 mg         15      7.5 mg         16      5 mg         17      7.5 mg         18      5 mg           19      5 mg         20            21               22               23               24               25                 26               27               28                    Date Details   No additional details    Date of next INR:  2/20/2017         How to take your warfarin dose     To take:  5 mg Take 1 of the 5 mg tablets.    To take:  7.5 mg Take 1.5 of the 5 mg tablets.            ELISEO Yeager: 70-year-old female with a past medical history of hypertension, hyperlipidemia, diabetes, CAD with stent presented to the emergency department complaining of chest pain.  Patient placed in CDU for cardiac monitoring, echo, stress test and TeleDoc.  Received call from stress lab patient with abnormal stress test spoke with telemetry doc Dr. Hannah advised to admit to her service.  Patient resting comfortably, no acute distress denies any complaints of present time, family at bedside.  Spoke with patient via  ID number [id number].

## 2025-06-20 NOTE — PATIENT PROFILE ADULT - FALL HARM RISK - HARM RISK INTERVENTIONS

## 2025-06-20 NOTE — H&P ADULT - PROBLEM SELECTOR PLAN 5
-Home Regimen: Jjanumet  mg BID  -A1c: 6.8%  -Hold home oral diabetic medications while inpatient   -Corrective SSI (Mild) TID before meals/Bedtime  -Goal -180  -FS Blood glucose monitoring Premeal/Bedtime   -Hypoglycemia precautions   -Carb Consistent Diet

## 2025-06-21 DIAGNOSIS — I25.10 ATHEROSCLEROTIC HEART DISEASE OF NATIVE CORONARY ARTERY WITHOUT ANGINA PECTORIS: ICD-10-CM

## 2025-06-21 DIAGNOSIS — E11.9 TYPE 2 DIABETES MELLITUS WITHOUT COMPLICATIONS: ICD-10-CM

## 2025-06-21 DIAGNOSIS — R07.9 CHEST PAIN, UNSPECIFIED: ICD-10-CM

## 2025-06-21 DIAGNOSIS — Z29.9 ENCOUNTER FOR PROPHYLACTIC MEASURES, UNSPECIFIED: ICD-10-CM

## 2025-06-21 DIAGNOSIS — I10 ESSENTIAL (PRIMARY) HYPERTENSION: ICD-10-CM

## 2025-06-21 DIAGNOSIS — K21.9 GASTRO-ESOPHAGEAL REFLUX DISEASE WITHOUT ESOPHAGITIS: ICD-10-CM

## 2025-06-21 DIAGNOSIS — E78.5 HYPERLIPIDEMIA, UNSPECIFIED: ICD-10-CM

## 2025-06-21 LAB
ANION GAP SERPL CALC-SCNC: 14 MMOL/L — SIGNIFICANT CHANGE UP (ref 7–14)
BASOPHILS # BLD AUTO: 0.02 K/UL — SIGNIFICANT CHANGE UP (ref 0–0.2)
BASOPHILS NFR BLD AUTO: 0.3 % — SIGNIFICANT CHANGE UP (ref 0–2)
BUN SERPL-MCNC: 18 MG/DL — SIGNIFICANT CHANGE UP (ref 7–23)
CALCIUM SERPL-MCNC: 9.2 MG/DL — SIGNIFICANT CHANGE UP (ref 8.4–10.5)
CHLORIDE SERPL-SCNC: 102 MMOL/L — SIGNIFICANT CHANGE UP (ref 98–107)
CO2 SERPL-SCNC: 21 MMOL/L — LOW (ref 22–31)
CREAT SERPL-MCNC: 0.7 MG/DL — SIGNIFICANT CHANGE UP (ref 0.5–1.3)
EGFR: 93 ML/MIN/1.73M2 — SIGNIFICANT CHANGE UP
EGFR: 93 ML/MIN/1.73M2 — SIGNIFICANT CHANGE UP
EOSINOPHIL # BLD AUTO: 0.09 K/UL — SIGNIFICANT CHANGE UP (ref 0–0.5)
EOSINOPHIL NFR BLD AUTO: 1.2 % — SIGNIFICANT CHANGE UP (ref 0–6)
GLUCOSE BLDC GLUCOMTR-MCNC: 145 MG/DL — HIGH (ref 70–99)
GLUCOSE BLDC GLUCOMTR-MCNC: 151 MG/DL — HIGH (ref 70–99)
GLUCOSE BLDC GLUCOMTR-MCNC: 164 MG/DL — HIGH (ref 70–99)
GLUCOSE BLDC GLUCOMTR-MCNC: 177 MG/DL — HIGH (ref 70–99)
GLUCOSE SERPL-MCNC: 120 MG/DL — HIGH (ref 70–99)
HCT VFR BLD CALC: 32.5 % — LOW (ref 34.5–45)
HGB BLD-MCNC: 10.6 G/DL — LOW (ref 11.5–15.5)
IMM GRANULOCYTES # BLD AUTO: 0.06 K/UL — SIGNIFICANT CHANGE UP (ref 0–0.07)
IMM GRANULOCYTES NFR BLD AUTO: 0.8 % — SIGNIFICANT CHANGE UP (ref 0–0.9)
LYMPHOCYTES # BLD AUTO: 2.33 K/UL — SIGNIFICANT CHANGE UP (ref 1–3.3)
LYMPHOCYTES NFR BLD AUTO: 31.3 % — SIGNIFICANT CHANGE UP (ref 13–44)
MAGNESIUM SERPL-MCNC: 1.8 MG/DL — SIGNIFICANT CHANGE UP (ref 1.6–2.6)
MCHC RBC-ENTMCNC: 24.1 PG — LOW (ref 27–34)
MCHC RBC-ENTMCNC: 32.6 G/DL — SIGNIFICANT CHANGE UP (ref 32–36)
MCV RBC AUTO: 74 FL — LOW (ref 80–100)
MONOCYTES # BLD AUTO: 0.44 K/UL — SIGNIFICANT CHANGE UP (ref 0–0.9)
MONOCYTES NFR BLD AUTO: 5.9 % — SIGNIFICANT CHANGE UP (ref 2–14)
MRSA PCR RESULT.: SIGNIFICANT CHANGE UP
NEUTROPHILS # BLD AUTO: 4.51 K/UL — SIGNIFICANT CHANGE UP (ref 1.8–7.4)
NEUTROPHILS NFR BLD AUTO: 60.5 % — SIGNIFICANT CHANGE UP (ref 43–77)
NRBC # BLD AUTO: 0 K/UL — SIGNIFICANT CHANGE UP (ref 0–0)
NRBC # FLD: 0 K/UL — SIGNIFICANT CHANGE UP (ref 0–0)
PHOSPHATE SERPL-MCNC: 3.9 MG/DL — SIGNIFICANT CHANGE UP (ref 2.5–4.5)
PLATELET # BLD AUTO: 150 K/UL — SIGNIFICANT CHANGE UP (ref 150–400)
PMV BLD: SIGNIFICANT CHANGE UP FL (ref 7–13)
POTASSIUM SERPL-MCNC: 4.4 MMOL/L — SIGNIFICANT CHANGE UP (ref 3.5–5.3)
POTASSIUM SERPL-SCNC: 4.4 MMOL/L — SIGNIFICANT CHANGE UP (ref 3.5–5.3)
RBC # BLD: 4.39 M/UL — SIGNIFICANT CHANGE UP (ref 3.8–5.2)
RBC # FLD: 16.3 % — HIGH (ref 10.3–14.5)
S AUREUS DNA NOSE QL NAA+PROBE: SIGNIFICANT CHANGE UP
SODIUM SERPL-SCNC: 137 MMOL/L — SIGNIFICANT CHANGE UP (ref 135–145)
TSH SERPL-MCNC: 2.76 UIU/ML — SIGNIFICANT CHANGE UP (ref 0.27–4.2)
WBC # BLD: 7.45 K/UL — SIGNIFICANT CHANGE UP (ref 3.8–10.5)
WBC # FLD AUTO: 7.45 K/UL — SIGNIFICANT CHANGE UP (ref 3.8–10.5)

## 2025-06-21 RX ADMIN — Medication 650 MILLIGRAM(S): at 21:00

## 2025-06-21 RX ADMIN — ENOXAPARIN SODIUM 40 MILLIGRAM(S): 100 INJECTION SUBCUTANEOUS at 12:00

## 2025-06-21 RX ADMIN — Medication 300 MILLIGRAM(S): at 11:57

## 2025-06-21 RX ADMIN — Medication 1 DROP(S): at 06:37

## 2025-06-21 RX ADMIN — Medication 40 MILLIGRAM(S): at 06:36

## 2025-06-21 RX ADMIN — Medication 650 MILLIGRAM(S): at 20:25

## 2025-06-21 RX ADMIN — Medication 1 DROP(S): at 17:08

## 2025-06-21 RX ADMIN — Medication 1 APPLICATION(S): at 11:57

## 2025-06-21 RX ADMIN — INSULIN LISPRO 1: 100 INJECTION, SOLUTION INTRAVENOUS; SUBCUTANEOUS at 12:18

## 2025-06-21 RX ADMIN — Medication 80 MILLIGRAM(S): at 06:36

## 2025-06-21 RX ADMIN — ATORVASTATIN CALCIUM 10 MILLIGRAM(S): 80 TABLET, FILM COATED ORAL at 21:22

## 2025-06-21 RX ADMIN — Medication 81 MILLIGRAM(S): at 11:57

## 2025-06-21 RX ADMIN — INSULIN LISPRO 1: 100 INJECTION, SOLUTION INTRAVENOUS; SUBCUTANEOUS at 08:29

## 2025-06-21 RX ADMIN — PREGABALIN 75 MILLIGRAM(S): 50 CAPSULE ORAL at 12:21

## 2025-06-21 RX ADMIN — INSULIN LISPRO 1: 100 INJECTION, SOLUTION INTRAVENOUS; SUBCUTANEOUS at 18:05

## 2025-06-21 NOTE — PROGRESS NOTE ADULT - NSPROGADDITIONALINFOA_GEN_ALL_CORE
Patient seen and examined by me. patient care and plan discussed and reviewed with PA. Plan as outlined above edited by me to reflect our discussion. Advanced care planning/advanced directives discussed with patient/family. DNR status including forceful chest compressions to attempt to restart the heart, ventilator support/artificial breathing, electric shock, artificial nutrition, health care proxy, Molst form all discussed with pt. More than 50% of the visit was spent counseling and/or coordinating care by the attending physician. Sixteen minutes spent on discussing advanced directives.

## 2025-06-22 LAB
ALBUMIN SERPL ELPH-MCNC: 4.2 G/DL — SIGNIFICANT CHANGE UP (ref 3.3–5)
ALP SERPL-CCNC: 116 U/L — SIGNIFICANT CHANGE UP (ref 40–120)
ALT FLD-CCNC: 26 U/L — SIGNIFICANT CHANGE UP (ref 4–33)
ANION GAP SERPL CALC-SCNC: 15 MMOL/L — HIGH (ref 7–14)
AST SERPL-CCNC: 28 U/L — SIGNIFICANT CHANGE UP (ref 4–32)
BILIRUB SERPL-MCNC: 0.4 MG/DL — SIGNIFICANT CHANGE UP (ref 0.2–1.2)
BUN SERPL-MCNC: 18 MG/DL — SIGNIFICANT CHANGE UP (ref 7–23)
CALCIUM SERPL-MCNC: 9.7 MG/DL — SIGNIFICANT CHANGE UP (ref 8.4–10.5)
CHLORIDE SERPL-SCNC: 97 MMOL/L — LOW (ref 98–107)
CO2 SERPL-SCNC: 24 MMOL/L — SIGNIFICANT CHANGE UP (ref 22–31)
CREAT SERPL-MCNC: 0.74 MG/DL — SIGNIFICANT CHANGE UP (ref 0.5–1.3)
EGFR: 87 ML/MIN/1.73M2 — SIGNIFICANT CHANGE UP
EGFR: 87 ML/MIN/1.73M2 — SIGNIFICANT CHANGE UP
GLUCOSE BLDC GLUCOMTR-MCNC: 145 MG/DL — HIGH (ref 70–99)
GLUCOSE BLDC GLUCOMTR-MCNC: 165 MG/DL — HIGH (ref 70–99)
GLUCOSE BLDC GLUCOMTR-MCNC: 207 MG/DL — HIGH (ref 70–99)
GLUCOSE BLDC GLUCOMTR-MCNC: 229 MG/DL — HIGH (ref 70–99)
GLUCOSE SERPL-MCNC: 125 MG/DL — HIGH (ref 70–99)
HCT VFR BLD CALC: 37.2 % — SIGNIFICANT CHANGE UP (ref 34.5–45)
HGB BLD-MCNC: 11.6 G/DL — SIGNIFICANT CHANGE UP (ref 11.5–15.5)
MAGNESIUM SERPL-MCNC: 1.9 MG/DL — SIGNIFICANT CHANGE UP (ref 1.6–2.6)
MCHC RBC-ENTMCNC: 23.5 PG — LOW (ref 27–34)
MCHC RBC-ENTMCNC: 31.2 G/DL — LOW (ref 32–36)
MCV RBC AUTO: 75.3 FL — LOW (ref 80–100)
NRBC # BLD AUTO: 0 K/UL — SIGNIFICANT CHANGE UP (ref 0–0)
NRBC # FLD: 0 K/UL — SIGNIFICANT CHANGE UP (ref 0–0)
PHOSPHATE SERPL-MCNC: 3.9 MG/DL — SIGNIFICANT CHANGE UP (ref 2.5–4.5)
PLATELET # BLD AUTO: 146 K/UL — LOW (ref 150–400)
PMV BLD: SIGNIFICANT CHANGE UP FL (ref 7–13)
POTASSIUM SERPL-MCNC: 4.4 MMOL/L — SIGNIFICANT CHANGE UP (ref 3.5–5.3)
POTASSIUM SERPL-SCNC: 4.4 MMOL/L — SIGNIFICANT CHANGE UP (ref 3.5–5.3)
PROT SERPL-MCNC: 7.8 G/DL — SIGNIFICANT CHANGE UP (ref 6–8.3)
RBC # BLD: 4.94 M/UL — SIGNIFICANT CHANGE UP (ref 3.8–5.2)
RBC # FLD: 16.5 % — HIGH (ref 10.3–14.5)
SODIUM SERPL-SCNC: 136 MMOL/L — SIGNIFICANT CHANGE UP (ref 135–145)
WBC # BLD: 7.93 K/UL — SIGNIFICANT CHANGE UP (ref 3.8–10.5)
WBC # FLD AUTO: 7.93 K/UL — SIGNIFICANT CHANGE UP (ref 3.8–10.5)

## 2025-06-22 RX ADMIN — Medication 81 MILLIGRAM(S): at 11:35

## 2025-06-22 RX ADMIN — PREGABALIN 75 MILLIGRAM(S): 50 CAPSULE ORAL at 11:35

## 2025-06-22 RX ADMIN — INSULIN LISPRO 1: 100 INJECTION, SOLUTION INTRAVENOUS; SUBCUTANEOUS at 17:27

## 2025-06-22 RX ADMIN — Medication 650 MILLIGRAM(S): at 21:22

## 2025-06-22 RX ADMIN — ENOXAPARIN SODIUM 40 MILLIGRAM(S): 100 INJECTION SUBCUTANEOUS at 11:36

## 2025-06-22 RX ADMIN — Medication 300 MILLIGRAM(S): at 11:36

## 2025-06-22 RX ADMIN — Medication 1 DROP(S): at 17:27

## 2025-06-22 RX ADMIN — INSULIN LISPRO 2: 100 INJECTION, SOLUTION INTRAVENOUS; SUBCUTANEOUS at 12:01

## 2025-06-22 RX ADMIN — Medication 1 DROP(S): at 05:21

## 2025-06-22 RX ADMIN — ATORVASTATIN CALCIUM 10 MILLIGRAM(S): 80 TABLET, FILM COATED ORAL at 21:22

## 2025-06-22 RX ADMIN — Medication 1 APPLICATION(S): at 11:37

## 2025-06-22 RX ADMIN — Medication 40 MILLIGRAM(S): at 05:21

## 2025-06-22 RX ADMIN — Medication 80 MILLIGRAM(S): at 05:20

## 2025-06-22 RX ADMIN — Medication 650 MILLIGRAM(S): at 22:15

## 2025-06-23 LAB
ALBUMIN SERPL ELPH-MCNC: 3.7 G/DL — SIGNIFICANT CHANGE UP (ref 3.3–5)
ALP SERPL-CCNC: 105 U/L — SIGNIFICANT CHANGE UP (ref 40–120)
ALT FLD-CCNC: 22 U/L — SIGNIFICANT CHANGE UP (ref 4–33)
ANION GAP SERPL CALC-SCNC: 14 MMOL/L — SIGNIFICANT CHANGE UP (ref 7–14)
AST SERPL-CCNC: 27 U/L — SIGNIFICANT CHANGE UP (ref 4–32)
BILIRUB SERPL-MCNC: <0.2 MG/DL — SIGNIFICANT CHANGE UP (ref 0.2–1.2)
BUN SERPL-MCNC: 22 MG/DL — SIGNIFICANT CHANGE UP (ref 7–23)
CALCIUM SERPL-MCNC: 8.6 MG/DL — SIGNIFICANT CHANGE UP (ref 8.4–10.5)
CHLORIDE SERPL-SCNC: 98 MMOL/L — SIGNIFICANT CHANGE UP (ref 98–107)
CO2 SERPL-SCNC: 22 MMOL/L — SIGNIFICANT CHANGE UP (ref 22–31)
CREAT SERPL-MCNC: 0.67 MG/DL — SIGNIFICANT CHANGE UP (ref 0.5–1.3)
EGFR: 94 ML/MIN/1.73M2 — SIGNIFICANT CHANGE UP
EGFR: 94 ML/MIN/1.73M2 — SIGNIFICANT CHANGE UP
GLUCOSE BLDC GLUCOMTR-MCNC: 144 MG/DL — HIGH (ref 70–99)
GLUCOSE BLDC GLUCOMTR-MCNC: 152 MG/DL — HIGH (ref 70–99)
GLUCOSE BLDC GLUCOMTR-MCNC: 156 MG/DL — HIGH (ref 70–99)
GLUCOSE BLDC GLUCOMTR-MCNC: 228 MG/DL — HIGH (ref 70–99)
GLUCOSE BLDC GLUCOMTR-MCNC: 245 MG/DL — HIGH (ref 70–99)
GLUCOSE SERPL-MCNC: 138 MG/DL — HIGH (ref 70–99)
HCT VFR BLD CALC: 34.2 % — LOW (ref 34.5–45)
HGB BLD-MCNC: 10.7 G/DL — LOW (ref 11.5–15.5)
MAGNESIUM SERPL-MCNC: 1.8 MG/DL — SIGNIFICANT CHANGE UP (ref 1.6–2.6)
MCHC RBC-ENTMCNC: 23.6 PG — LOW (ref 27–34)
MCHC RBC-ENTMCNC: 31.3 G/DL — LOW (ref 32–36)
MCV RBC AUTO: 75.5 FL — LOW (ref 80–100)
NRBC # BLD AUTO: 0 K/UL — SIGNIFICANT CHANGE UP (ref 0–0)
NRBC # FLD: 0 K/UL — SIGNIFICANT CHANGE UP (ref 0–0)
PHOSPHATE SERPL-MCNC: 3.4 MG/DL — SIGNIFICANT CHANGE UP (ref 2.5–4.5)
PLATELET # BLD AUTO: 143 K/UL — LOW (ref 150–400)
PMV BLD: SIGNIFICANT CHANGE UP FL (ref 7–13)
POTASSIUM SERPL-MCNC: 4.3 MMOL/L — SIGNIFICANT CHANGE UP (ref 3.5–5.3)
POTASSIUM SERPL-SCNC: 4.3 MMOL/L — SIGNIFICANT CHANGE UP (ref 3.5–5.3)
PROT SERPL-MCNC: 6.8 G/DL — SIGNIFICANT CHANGE UP (ref 6–8.3)
RBC # BLD: 4.53 M/UL — SIGNIFICANT CHANGE UP (ref 3.8–5.2)
RBC # FLD: 16.3 % — HIGH (ref 10.3–14.5)
SODIUM SERPL-SCNC: 134 MMOL/L — LOW (ref 135–145)
WBC # BLD: 6.81 K/UL — SIGNIFICANT CHANGE UP (ref 3.8–10.5)
WBC # FLD AUTO: 6.81 K/UL — SIGNIFICANT CHANGE UP (ref 3.8–10.5)

## 2025-06-23 RX ADMIN — Medication 1 DROP(S): at 17:34

## 2025-06-23 RX ADMIN — INSULIN LISPRO 1: 100 INJECTION, SOLUTION INTRAVENOUS; SUBCUTANEOUS at 08:06

## 2025-06-23 RX ADMIN — INSULIN LISPRO 2: 100 INJECTION, SOLUTION INTRAVENOUS; SUBCUTANEOUS at 17:36

## 2025-06-23 RX ADMIN — Medication 1 APPLICATION(S): at 11:16

## 2025-06-23 RX ADMIN — Medication 650 MILLIGRAM(S): at 17:30

## 2025-06-23 RX ADMIN — PREGABALIN 75 MILLIGRAM(S): 50 CAPSULE ORAL at 11:15

## 2025-06-23 RX ADMIN — Medication 300 MILLIGRAM(S): at 11:15

## 2025-06-23 RX ADMIN — Medication 80 MILLIGRAM(S): at 05:18

## 2025-06-23 RX ADMIN — ATORVASTATIN CALCIUM 10 MILLIGRAM(S): 80 TABLET, FILM COATED ORAL at 21:16

## 2025-06-23 RX ADMIN — Medication 1 DROP(S): at 05:18

## 2025-06-23 RX ADMIN — Medication 75 MILLILITER(S): at 12:00

## 2025-06-23 RX ADMIN — Medication 650 MILLIGRAM(S): at 18:30

## 2025-06-23 RX ADMIN — Medication 81 MILLIGRAM(S): at 11:15

## 2025-06-23 RX ADMIN — Medication 40 MILLIGRAM(S): at 05:18

## 2025-06-23 RX ADMIN — Medication 1000 MILLILITER(S): at 11:45

## 2025-06-23 NOTE — PRE PROCEDURE NOTE - PRE PROCEDURE EVALUATION
Pre-sedation evaluation    Dentures: none  Last PO intake: 6/22/25 PM    Level of consciousness: 1  Obstructive sleep apnea: No  Aspiration risk: No  Mallampati score: 2  ASA Classification: 2  Prior Sedative or Anesthesia Experience: No complications  Informed consent by responsible adult: Yes  Responsible adult escort: Yes  Based on today's assessment, anesthesia consult requested: No

## 2025-06-23 NOTE — DISCHARGE NOTE NURSING/CASE MANAGEMENT/SOCIAL WORK - NSDCPEFALRISK_GEN_ALL_CORE
For information on Fall & Injury Prevention, visit: [email or web address] OR  [email or web address] OR  https://www.cdc.gov/steadi/patient.html

## 2025-06-23 NOTE — CHART NOTE - NSCHARTNOTEFT_GEN_A_CORE
Pt is s/p cath via RFA access. Right femoral site without bleeding or hematoma. Dressing is intact. Positive Pulses. No bruit auscultated. Will continue to monitor.

## 2025-06-23 NOTE — DISCHARGE NOTE NURSING/CASE MANAGEMENT/SOCIAL WORK - FINANCIAL ASSISTANCE
[facility] provides services at a reduced cost to those who are determined to be eligible through [facility]’s financial assistance program. Information regarding [facility]’s financial assistance program can be found by going to [email or web address] or by calling [phone].

## 2025-06-23 NOTE — DISCHARGE NOTE PROVIDER - HOSPITAL COURSE
70F PMHx HTN, HLD, T2DM, GERD, CAD p/w chest pain and dyspnea. TTE: LVEF 67%. Grade 1 DD. Mild MR. NST notable for mild to moderate defects in the inferolateral and anteroseptal walls that are reversible suggestive of ischemia. S/p [facility] 6/23 mild disease, EDP22. Pt to c/w ASA and statin. 70F PMHx HTN, HLD, T2DM, GERD, CAD p/w chest pain and dyspnea. TTE: LVEF 67%. Grade 1 DD. Mild MR. NST notable for mild to moderate defects in the inferolateral and anteroseptal walls that are reversible suggestive of ischemia. S/p [facility] 6/23 mild disease, EDP22. Pt to c/w ASA and statin.    Case discussed with Dr. Mendes on 6/24, pt medically stable for discharge home.

## 2025-06-23 NOTE — DISCHARGE NOTE PROVIDER - NSDCMRMEDTOKEN_GEN_ALL_CORE_FT
allopurinol 300 mg oral tablet: 1 tab(s) orally once a day  Artificial Tears ophthalmic solution: 1 drop(s) in each eye 2 times a day  aspirin 81 mg oral delayed release tablet: 1 tab(s) orally once a day  Janumet 50 mg-500 mg oral tablet: 1 tab(s) orally 2 times a day  omeprazole 20 mg oral delayed release tablet: 1 tab(s) orally once a day  pregabalin 75 mg oral capsule: 1 cap(s) orally once a day  simvastatin 20 mg oral tablet: 1 tab(s) orally once a day  valsartan-hydrochlorothiazide 80 mg-12.5 mg oral tablet: 1 tab(s) orally once a day

## 2025-06-23 NOTE — DISCHARGE NOTE PROVIDER - NSDCCPTREATMENT_GEN_ALL_CORE_FT
PRINCIPAL PROCEDURE  Procedure: Stress test, cardiopulmonary, nuclear medicine  Findings and Treatment: 1. Qualitative Perfusion:      - small-sized, mild to moderate defect(s) in the inferolateral and anteroseptal walls that are reversible suggestive of ischemia.   2. The left ventricle is normal in function and normal in size. Left ventricular ejection fraction of greater than 75%. The time activity curve suggests diastolic dysfunction.      SECONDARY PROCEDURE  Procedure: Complete transthoracic echocardiography (TTE)  Findings and Treatment: CONCLUSIONS:      1. Left ventricular cavity is normal in size. Left ventricular wall thickness is normal. Left ventricular systolic function is normal with an ejection fraction of 67 % by 3D. There are no regional wall motion abnormalities seen.   2. There is mild (grade 1) left ventricular diastolic dysfunction.   3. Left ventricular global longitudinal strain is -20.6 % which is normal (< -18%). Images were acquired on a [employer] ultrasound system and processed on the ultrasound machine with a heart rate of 57 bpm and a blood pressure of 150/55 mmHg.   4. Normal right ventricular cavity size and normal right ventricular systolic function. Tricuspid annular plane systolic excursion (TAPSE) is 2.0 cm (normal >=1.7 cm).   5. Structurally normal mitral valve with normal leaflet excursion. There is calcification of the mitral valve annulus. There is mild mitral regurgitation.     PRINCIPAL PROCEDURE  Procedure: Stress test, cardiopulmonary, nuclear medicine  Findings and Treatment: 1. Qualitative Perfusion:      - small-sized, mild to moderate defect(s) in the inferolateral and anteroseptal walls that are reversible suggestive of ischemia.   2. The left ventricle is normal in function and normal in size. Left ventricular ejection fraction of greater than 75%. The time activity curve suggests diastolic dysfunction.      SECONDARY PROCEDURE  Procedure: Complete transthoracic echocardiography (TTE)  Findings and Treatment: CONCLUSIONS:      1. Left ventricular cavity is normal in size. Left ventricular wall thickness is normal. Left ventricular systolic function is normal with an ejection fraction of 67 % by 3D. There are no regional wall motion abnormalities seen.   2. There is mild (grade 1) left ventricular diastolic dysfunction.   3. Left ventricular global longitudinal strain is -20.6 % which is normal (< -18%). Images were acquired on a [employer] ultrasound system and processed on the ultrasound machine with a heart rate of 57 bpm and a blood pressure of 150/55 mmHg.   4. Normal right ventricular cavity size and normal right ventricular systolic function. Tricuspid annular plane systolic excursion (TAPSE) is 2.0 cm (normal >=1.7 cm).   5. Structurally normal mitral valve with normal leaflet excursion. There is calcification of the mitral valve annulus. There is mild mitral regurgitation.    Procedure: Left heart catheterization  Findings and Treatment: Diagnostic Conclusions:   Mild CAD   Sigificantly elevated BP   Recommendations:   Risk factor modification and BP control   General Impressions:   General Diagnostic:      General Diagnostic Impressions

## 2025-06-23 NOTE — DISCHARGE NOTE PROVIDER - NSDCCPCAREPLAN_GEN_ALL_CORE_FT
PRINCIPAL DISCHARGE DIAGNOSIS  Diagnosis: CAD (coronary artery disease)  Assessment and Plan of Treatment: You had a cardiac catheterization done that showed mild coronary artery disease. Please continue aspirin and atorvastatin and follow up with your Cardiologist.      SECONDARY DISCHARGE DIAGNOSES  Diagnosis: HTN (hypertension)  Assessment and Plan of Treatment: Continue blood pressure medications.    Diagnosis: HLD (hyperlipidemia)  Assessment and Plan of Treatment: Continue Simvastatin.

## 2025-06-23 NOTE — DISCHARGE NOTE PROVIDER - CARE PROVIDERS DIRECT ADDRESSES
[email or web address]

## 2025-06-23 NOTE — PROVIDER CONTACT NOTE (OTHER) - ASSESSMENT
Patient axo4, breathing adequately on room air. No complaints stated at this time, no s/s of distress noted. Other VSS.

## 2025-06-24 VITALS
TEMPERATURE: 98 F | SYSTOLIC BLOOD PRESSURE: 160 MMHG | HEART RATE: 74 BPM | OXYGEN SATURATION: 100 % | DIASTOLIC BLOOD PRESSURE: 65 MMHG | RESPIRATION RATE: 18 BRPM

## 2025-06-24 LAB
ANION GAP SERPL CALC-SCNC: 12 MMOL/L — SIGNIFICANT CHANGE UP (ref 7–14)
BUN SERPL-MCNC: 18 MG/DL — SIGNIFICANT CHANGE UP (ref 7–23)
CALCIUM SERPL-MCNC: 9.5 MG/DL — SIGNIFICANT CHANGE UP (ref 8.4–10.5)
CHLORIDE SERPL-SCNC: 101 MMOL/L — SIGNIFICANT CHANGE UP (ref 98–107)
CO2 SERPL-SCNC: 24 MMOL/L — SIGNIFICANT CHANGE UP (ref 22–31)
CREAT SERPL-MCNC: 0.67 MG/DL — SIGNIFICANT CHANGE UP (ref 0.5–1.3)
EGFR: 94 ML/MIN/1.73M2 — SIGNIFICANT CHANGE UP
EGFR: 94 ML/MIN/1.73M2 — SIGNIFICANT CHANGE UP
GLUCOSE BLDC GLUCOMTR-MCNC: 149 MG/DL — HIGH (ref 70–99)
GLUCOSE BLDC GLUCOMTR-MCNC: 166 MG/DL — HIGH (ref 70–99)
GLUCOSE SERPL-MCNC: 153 MG/DL — HIGH (ref 70–99)
HCT VFR BLD CALC: 35.9 % — SIGNIFICANT CHANGE UP (ref 34.5–45)
HGB BLD-MCNC: 11.2 G/DL — LOW (ref 11.5–15.5)
MAGNESIUM SERPL-MCNC: 1.8 MG/DL — SIGNIFICANT CHANGE UP (ref 1.6–2.6)
MCHC RBC-ENTMCNC: 23.4 PG — LOW (ref 27–34)
MCHC RBC-ENTMCNC: 31.2 G/DL — LOW (ref 32–36)
MCV RBC AUTO: 75.1 FL — LOW (ref 80–100)
NRBC # BLD AUTO: 0 K/UL — SIGNIFICANT CHANGE UP (ref 0–0)
NRBC # FLD: 0 K/UL — SIGNIFICANT CHANGE UP (ref 0–0)
PHOSPHATE SERPL-MCNC: 3.5 MG/DL — SIGNIFICANT CHANGE UP (ref 2.5–4.5)
PLATELET # BLD AUTO: 104 K/UL — LOW (ref 150–400)
PMV BLD: SIGNIFICANT CHANGE UP FL (ref 7–13)
POTASSIUM SERPL-MCNC: 4.5 MMOL/L — SIGNIFICANT CHANGE UP (ref 3.5–5.3)
POTASSIUM SERPL-SCNC: 4.5 MMOL/L — SIGNIFICANT CHANGE UP (ref 3.5–5.3)
RBC # BLD: 4.78 M/UL — SIGNIFICANT CHANGE UP (ref 3.8–5.2)
RBC # FLD: 16 % — HIGH (ref 10.3–14.5)
SODIUM SERPL-SCNC: 137 MMOL/L — SIGNIFICANT CHANGE UP (ref 135–145)
WBC # BLD: 6.23 K/UL — SIGNIFICANT CHANGE UP (ref 3.8–10.5)
WBC # FLD AUTO: 6.23 K/UL — SIGNIFICANT CHANGE UP (ref 3.8–10.5)

## 2025-06-24 RX ADMIN — Medication 81 MILLIGRAM(S): at 11:30

## 2025-06-24 RX ADMIN — PREGABALIN 75 MILLIGRAM(S): 50 CAPSULE ORAL at 11:30

## 2025-06-24 RX ADMIN — Medication 40 MILLIGRAM(S): at 05:33

## 2025-06-24 RX ADMIN — Medication 80 MILLIGRAM(S): at 05:33

## 2025-06-24 RX ADMIN — INSULIN LISPRO 1: 100 INJECTION, SOLUTION INTRAVENOUS; SUBCUTANEOUS at 08:39

## 2025-06-24 RX ADMIN — Medication 300 MILLIGRAM(S): at 11:30

## 2025-06-24 RX ADMIN — Medication 1 APPLICATION(S): at 11:32

## 2025-06-24 RX ADMIN — Medication 1 DROP(S): at 05:33

## 2025-06-24 NOTE — PROGRESS NOTE ADULT - PROBLEM SELECTOR PLAN 6
.c/w PPI as per formulary

## 2025-06-24 NOTE — PROGRESS NOTE ADULT - SUBJECTIVE AND OBJECTIVE BOX
[facility] Attending Follow-up Note     Patient seen and examined at bedside.    Overnight Events:     events noted    REVIEW OF SYSTEMS:  Constitutional:     [x ] negative [ ] fevers [ ] chills [ ] weight loss [ ] weight gain  HEENT:                  [x ] negative [ ] dry eyes [ ] eye irritation [ ] postnasal drip [ ] nasal congestion  CV:                         [ x] negative  [ ] chest pain [ ] orthopnea [ ] palpitations [ ] murmur  Resp:                     [ x] negative [ ] cough [ ] shortness of breath [ ] dyspnea [ ] wheezing [ ] sputum [ ]hemoptysis  GI:                          [ x] negative [ ] nausea [ ] vomiting [ ] diarrhea [ ] constipation [ ] abd pain [ ] dysphagia   :                        [ x] negative [ ] dysuria [ ] nocturia [ ] hematuria [ ] increased urinary frequency  Musculoskeletal: [ x] negative [ ] back pain [ ] myalgias [ ] arthralgias [ ] fracture  Skin:                       [ x] negative [ ] rash [ ] itch  Neurological:        [x ] negative [ ] headache [ ] dizziness [ ] syncope [ ] weakness [ ] numbness  Psychiatric:           [ x] negative [ ] anxiety [ ] depression  Endocrine:            [ x] negative [ ] diabetes [ ] thyroid problem  Heme/Lymph:      [ x] negative [ ] anemia [ ] bleeding problem  Allergic/Immune: [ x] negative [ ] itchy eyes [ ] nasal discharge [ ] hives [ ] angioedema    [ x] All other systems negative  [ ] Unable to assess ROS due to    Current Meds:  acetaminophen     Tablet .. 650 milliGRAM(s) Oral every 6 hours PRN  allopurinol 300 milliGRAM(s) Oral daily  artificial  tears Solution 1 Drop(s) Both EYES two times a day  aspirin enteric coated 81 milliGRAM(s) Oral daily  atorvastatin 10 milliGRAM(s) Oral at bedtime  chlorhexidine 2% Cloths 1 Application(s) Topical daily  dextrose 5%. 1000 milliLiter(s) IV Continuous <Continuous>  dextrose 5%. 1000 milliLiter(s) IV Continuous <Continuous>  dextrose 50% Injectable 25 Gram(s) IV Push once  dextrose 50% Injectable 12.5 Gram(s) IV Push once  dextrose 50% Injectable 25 Gram(s) IV Push once  dextrose Oral Gel 15 Gram(s) Oral once PRN  glucagon  Injectable 1 milliGRAM(s) IntraMuscular once  hydrochlorothiazide 12.5 milliGRAM(s) Oral daily  insulin lispro (ADMELOG) corrective regimen sliding scale   SubCutaneous three times a day before meals  insulin lispro (ADMELOG) corrective regimen sliding scale   SubCutaneous at bedtime  pantoprazole    Tablet 40 milliGRAM(s) Oral before breakfast  pregabalin 75 milliGRAM(s) Oral daily  valsartan 80 milliGRAM(s) Oral daily      PAST MEDICAL & SURGICAL HISTORY:  Hyperlipidemia      Hypertension      Diabetes      CAD (coronary artery disease)      Stented coronary artery          Vitals:  T(F): 98.8 (06-22), Max: 98.8 (06-22)  HR: 78 (06-22) (64 - 78)  BP: 143/60 (06-22) (137/75 - 162/63)  RR: 18 (06-22)  SpO2: 100% (06-22)  I&O's Summary      Physical Exam:  Appearance: No acute distress  HENT: No JVD   Cardiovascular: RRR, S1/S2, no murmurs  Respiratory: CTABL  Gastrointestinal: soft, NT ND, +BS  Musculoskeletal: No clubbing, no edema   Neurologic: Non-focal  Skin: No rashes, ecchymoses, or cyanosis                          11.6   7.93  )-----------( 146      ( 22 Jun 2025 05:10 )             37.2     06-22    136  |  97[L]  |  18  ----------------------------<  125[H]  4.4   |  24  |  0.74    Ca    9.7      22 Jun 2025 05:10  Phos  3.9     06-22  Mg     1.90     06-22    TPro  7.8  /  Alb  4.2  /  TBili  0.4  /  DBili  x   /  AST  28  /  ALT  26  /  AlkPhos  116  06-22                  Cardiovascular Testings:     
[facility] Attending Follow-up Note     Patient seen and examined at bedside. 6/21/2025    Overnight Events:     events noted     REVIEW OF SYSTEMS:  Constitutional:     [x ] negative [ ] fevers [ ] chills [ ] weight loss [ ] weight gain  HEENT:                  [x ] negative [ ] dry eyes [ ] eye irritation [ ] postnasal drip [ ] nasal congestion  CV:                         [ x] negative  [ ] chest pain [ ] orthopnea [ ] palpitations [ ] murmur  Resp:                     [ x] negative [ ] cough [ ] shortness of breath [ ] dyspnea [ ] wheezing [ ] sputum [ ]hemoptysis  GI:                          [ x] negative [ ] nausea [ ] vomiting [ ] diarrhea [ ] constipation [ ] abd pain [ ] dysphagia   :                        [ x] negative [ ] dysuria [ ] nocturia [ ] hematuria [ ] increased urinary frequency  Musculoskeletal: [ x] negative [ ] back pain [ ] myalgias [ ] arthralgias [ ] fracture  Skin:                       [ x] negative [ ] rash [ ] itch  Neurological:        [x ] negative [ ] headache [ ] dizziness [ ] syncope [ ] weakness [ ] numbness  Psychiatric:           [ x] negative [ ] anxiety [ ] depression  Endocrine:            [ x] negative [ ] diabetes [ ] thyroid problem  Heme/Lymph:      [ x] negative [ ] anemia [ ] bleeding problem  Allergic/Immune: [ x] negative [ ] itchy eyes [ ] nasal discharge [ ] hives [ ] angioedema    [ x] All other systems negative  [ ] Unable to assess ROS due to    Current Meds:  acetaminophen     Tablet .. 650 milliGRAM(s) Oral every 6 hours PRN  allopurinol 300 milliGRAM(s) Oral daily  artificial  tears Solution 1 Drop(s) Both EYES two times a day  aspirin enteric coated 81 milliGRAM(s) Oral daily  atorvastatin 10 milliGRAM(s) Oral at bedtime  chlorhexidine 2% Cloths 1 Application(s) Topical daily  dextrose 5%. 1000 milliLiter(s) IV Continuous <Continuous>  dextrose 5%. 1000 milliLiter(s) IV Continuous <Continuous>  dextrose 50% Injectable 25 Gram(s) IV Push once  dextrose 50% Injectable 12.5 Gram(s) IV Push once  dextrose 50% Injectable 25 Gram(s) IV Push once  dextrose Oral Gel 15 Gram(s) Oral once PRN  glucagon  Injectable 1 milliGRAM(s) IntraMuscular once  hydrochlorothiazide 12.5 milliGRAM(s) Oral daily  insulin lispro (ADMELOG) corrective regimen sliding scale   SubCutaneous three times a day before meals  insulin lispro (ADMELOG) corrective regimen sliding scale   SubCutaneous at bedtime  pantoprazole    Tablet 40 milliGRAM(s) Oral before breakfast  pregabalin 75 milliGRAM(s) Oral daily  valsartan 80 milliGRAM(s) Oral daily      PAST MEDICAL & SURGICAL HISTORY:  Hyperlipidemia      Hypertension      Diabetes      CAD (coronary artery disease)      Stented coronary artery          Vitals:  T(F): 98.8 (06-22), Max: 98.8 (06-22)  HR: 78 (06-22) (64 - 78)  BP: 143/60 (06-22) (137/75 - 162/63)  RR: 18 (06-22)  SpO2: 100% (06-22)  I&O's Summary      Physical Exam:  Appearance: No acute distress  HENT: No JVD   Cardiovascular: RRR, S1/S2, no murmurs  Respiratory: CTABL  Gastrointestinal: soft, NT ND, +BS  Musculoskeletal: No clubbing, no edema   Neurologic: Non-focal  Skin: No rashes, ecchymoses, or cyanosis                          11.6   7.93  )-----------( 146      ( 22 Jun 2025 05:10 )             37.2     06-22    136  |  97[L]  |  18  ----------------------------<  125[H]  4.4   |  24  |  0.74    Ca    9.7      22 Jun 2025 05:10  Phos  3.9     06-22  Mg     1.90     06-22    TPro  7.8  /  Alb  4.2  /  TBili  0.4  /  DBili  x   /  AST  28  /  ALT  26  /  AlkPhos  116  06-22                  Cardiovascular Testings:     
Name of Patient : NATALIA PELAYO  MRN: [id number]  Date of visit: 06-24-25     Subjective: Patient seen and examined. No new events except as noted.     REVIEW OF SYSTEMS:    CONSTITUTIONAL: No weakness, fevers or chills  EYES/ENT: No visual changes;  No vertigo or throat pain   NECK: No pain or stiffness  RESPIRATORY: No cough, wheezing, hemoptysis; No shortness of breath  CARDIOVASCULAR: No chest pain or palpitations  GASTROINTESTINAL: No abdominal or epigastric pain. No nausea, vomiting, or hematemesis; No diarrhea or constipation. No melena or hematochezia.  GENITOURINARY: No dysuria, frequency or hematuria  NEUROLOGICAL: No numbness or weakness  SKIN: No itching, burning, rashes, or lesions   All other review of systems is negative unless indicated above.    MEDICATIONS:  MEDICATIONS  (STANDING):  allopurinol 300 milliGRAM(s) Oral daily  artificial  tears Solution 1 Drop(s) Both EYES two times a day  aspirin enteric coated 81 milliGRAM(s) Oral daily  atorvastatin 10 milliGRAM(s) Oral at bedtime  chlorhexidine 2% Cloths 1 Application(s) Topical daily  dextrose 5%. 1000 milliLiter(s) (100 mL/Hr) IV Continuous <Continuous>  dextrose 5%. 1000 milliLiter(s) (50 mL/Hr) IV Continuous <Continuous>  dextrose 50% Injectable 25 Gram(s) IV Push once  dextrose 50% Injectable 12.5 Gram(s) IV Push once  dextrose 50% Injectable 25 Gram(s) IV Push once  glucagon  Injectable 1 milliGRAM(s) IntraMuscular once  hydrochlorothiazide 12.5 milliGRAM(s) Oral daily  insulin lispro (ADMELOG) corrective regimen sliding scale   SubCutaneous three times a day before meals  insulin lispro (ADMELOG) corrective regimen sliding scale   SubCutaneous at bedtime  pantoprazole    Tablet 40 milliGRAM(s) Oral before breakfast  pregabalin 75 milliGRAM(s) Oral daily  sodium chloride 0.9%. 500 milliLiter(s) (75 mL/Hr) IV Continuous <Continuous>  valsartan 80 milliGRAM(s) Oral daily      PHYSICAL EXAM:  T(C): 36.9 (06-24-25 @ 12:00), Max: 37.3 (06-24-25 @ 08:00)  HR: 74 (06-24-25 @ 12:00) (58 - 74)  BP: 160/65 (06-24-25 @ 12:00) (128/73 - 160/65)  RR: 18 (06-24-25 @ 12:00) (16 - 18)  SpO2: 100% (06-24-25 @ 12:00) (100% - 100%)  Wt(kg): --  I&O's Summary        Appearance: Normal	  HEENT:  PERRLA   Lymphatic: No lymphadenopathy   Cardiovascular: Normal S1 S2, no JVD  Respiratory: normal effort , clear  Gastrointestinal:  Soft, Non-tender  Skin: No rashes,  warm to touch  Psychiatry:  Mood & affect appropriate  Musculuskeletal: No edema    recent labs, Imaging and EKGs personally reviewed   CODE status discussed with the patient in detail                          11.2   6.23  )-----------( 104      ( 24 Jun 2025 05:10 )             35.9               06-24    137  |  101  |  18  ----------------------------<  153[H]  4.5   |  24  |  0.67    Ca    9.5      24 Jun 2025 05:10  Phos  3.5     06-24  Mg     1.80     06-24    TPro  6.8  /  Alb  3.7  /  TBili  <0.2  /  DBili  x   /  AST  27  /  ALT  22  /  AlkPhos  105  06-23                       Urinalysis Basic - ( 24 Jun 2025 05:10 )    Color: x / Appearance: x / SG: x / pH: x  Gluc: 153 mg/dL / Ketone: x  / Bili: x / Urobili: x   Blood: x / Protein: x / Nitrite: x   Leuk Esterase: x / RBC: x / WBC x   Sq Epi: x / Non Sq Epi: x / Bacteria: x                    
Name of Patient : NATALIA PELAYO  MRN: [id number]  Date of visit: 06-23-25 @ 14:31      Subjective: Patient seen and examined. No new events except as noted.   Doing okay  cath today     REVIEW OF SYSTEMS:    CONSTITUTIONAL: No weakness, fevers or chills  EYES/ENT: No visual changes;  No vertigo or throat pain   NECK: No pain or stiffness  RESPIRATORY: No cough, wheezing, hemoptysis; No shortness of breath  CARDIOVASCULAR: No chest pain or palpitations  GASTROINTESTINAL: No abdominal or epigastric pain. No nausea, vomiting, or hematemesis; No diarrhea or constipation. No melena or hematochezia.  GENITOURINARY: No dysuria, frequency or hematuria  NEUROLOGICAL: No numbness or weakness  SKIN: No itching, burning, rashes, or lesions   All other review of systems is negative unless indicated above.    MEDICATIONS:  MEDICATIONS  (STANDING):  allopurinol 300 milliGRAM(s) Oral daily  artificial  tears Solution 1 Drop(s) Both EYES two times a day  aspirin enteric coated 81 milliGRAM(s) Oral daily  atorvastatin 10 milliGRAM(s) Oral at bedtime  chlorhexidine 2% Cloths 1 Application(s) Topical daily  dextrose 5%. 1000 milliLiter(s) (100 mL/Hr) IV Continuous <Continuous>  dextrose 5%. 1000 milliLiter(s) (50 mL/Hr) IV Continuous <Continuous>  dextrose 50% Injectable 25 Gram(s) IV Push once  dextrose 50% Injectable 12.5 Gram(s) IV Push once  dextrose 50% Injectable 25 Gram(s) IV Push once  glucagon  Injectable 1 milliGRAM(s) IntraMuscular once  hydrochlorothiazide 12.5 milliGRAM(s) Oral daily  insulin lispro (ADMELOG) corrective regimen sliding scale   SubCutaneous three times a day before meals  insulin lispro (ADMELOG) corrective regimen sliding scale   SubCutaneous at bedtime  pantoprazole    Tablet 40 milliGRAM(s) Oral before breakfast  pregabalin 75 milliGRAM(s) Oral daily  sodium chloride 0.9%. 500 milliLiter(s) (75 mL/Hr) IV Continuous <Continuous>  valsartan 80 milliGRAM(s) Oral daily      PHYSICAL EXAM:  T(C): 36.7 (06-23-25 @ 12:35), Max: 37.1 (06-22-25 @ 20:00)  HR: 61 (06-23-25 @ 13:25) (61 - 79)  BP: 160/68 (06-23-25 @ 13:25) (119/72 - 179/68)  RR: 16 (06-23-25 @ 13:25) (14 - 18)  SpO2: 97% (06-23-25 @ 13:25) (96% - 100%)  Wt(kg): --  I&O's Summary        Appearance: Normal	  HEENT:  PERRLA   Lymphatic: No lymphadenopathy   Cardiovascular: Normal S1 S2, no JVD  Respiratory: normal effort , clear  Gastrointestinal:  Soft, Non-tender  Skin: No rashes,  warm to touch  Psychiatry:  Mood & affect appropriate  Musculuskeletal: No edema    recent labs, Imaging and EKGs personally reviewed   CODE status discussed with the patient in detail                            10.7   6.81  )-----------( 143      ( 23 Jun 2025 05:15 )             34.2               06-23    134[L]  |  98  |  22  ----------------------------<  138[H]  4.3   |  22  |  0.67    Ca    8.6      23 Jun 2025 05:15  Phos  3.4     06-23  Mg     1.80     06-23    TPro  6.8  /  Alb  3.7  /  TBili  <0.2  /  DBili  x   /  AST  27  /  ALT  22  /  AlkPhos  105  06-23                       Urinalysis Basic - ( 23 Jun 2025 05:15 )    Color: x / Appearance: x / SG: x / pH: x  Gluc: 138 mg/dL / Ketone: x  / Bili: x / Urobili: x   Blood: x / Protein: x / Nitrite: x   Leuk Esterase: x / RBC: x / WBC x   Sq Epi: x / Non Sq Epi: x / Bacteria: x                  
Name of Patient : NATALIA PELAYO  MRN: [id number]  Date of visit: 06-22-25 @ 16:55      Subjective: Patient seen and examined. No new events except as noted.   doing okay     REVIEW OF SYSTEMS:    CONSTITUTIONAL: No weakness, fevers or chills  EYES/ENT: No visual changes;  No vertigo or throat pain   NECK: No pain or stiffness  RESPIRATORY: No cough, wheezing, hemoptysis; No shortness of breath  CARDIOVASCULAR: No chest pain or palpitations  GASTROINTESTINAL: No abdominal or epigastric pain. No nausea, vomiting, or hematemesis; No diarrhea or constipation. No melena or hematochezia.  GENITOURINARY: No dysuria, frequency or hematuria  NEUROLOGICAL: No numbness or weakness  SKIN: No itching, burning, rashes, or lesions   All other review of systems is negative unless indicated above.    MEDICATIONS:  MEDICATIONS  (STANDING):  allopurinol 300 milliGRAM(s) Oral daily  artificial  tears Solution 1 Drop(s) Both EYES two times a day  aspirin enteric coated 81 milliGRAM(s) Oral daily  atorvastatin 10 milliGRAM(s) Oral at bedtime  chlorhexidine 2% Cloths 1 Application(s) Topical daily  dextrose 5%. 1000 milliLiter(s) (100 mL/Hr) IV Continuous <Continuous>  dextrose 5%. 1000 milliLiter(s) (50 mL/Hr) IV Continuous <Continuous>  dextrose 50% Injectable 25 Gram(s) IV Push once  dextrose 50% Injectable 12.5 Gram(s) IV Push once  dextrose 50% Injectable 25 Gram(s) IV Push once  enoxaparin Injectable 40 milliGRAM(s) SubCutaneous every 24 hours  glucagon  Injectable 1 milliGRAM(s) IntraMuscular once  hydrochlorothiazide 12.5 milliGRAM(s) Oral daily  insulin lispro (ADMELOG) corrective regimen sliding scale   SubCutaneous three times a day before meals  insulin lispro (ADMELOG) corrective regimen sliding scale   SubCutaneous at bedtime  pantoprazole    Tablet 40 milliGRAM(s) Oral before breakfast  pregabalin 75 milliGRAM(s) Oral daily  valsartan 80 milliGRAM(s) Oral daily      PHYSICAL EXAM:  T(C): 37.1 (06-22-25 @ 12:00), Max: 37.1 (06-22-25 @ 12:00)  HR: 69 (06-22-25 @ 12:00) (64 - 72)  BP: 137/75 (06-22-25 @ 12:00) (136/72 - 162/63)  RR: 18 (06-22-25 @ 12:00) (16 - 18)  SpO2: 100% (06-22-25 @ 12:00) (100% - 100%)  Wt(kg): --  I&O's Summary        Appearance: Normal	  HEENT:  PERRLA   Lymphatic: No lymphadenopathy   Cardiovascular: Normal S1 S2, no JVD  Respiratory: normal effort , clear  Gastrointestinal:  Soft, Non-tender  Skin: No rashes,  warm to touch  Psychiatry:  Mood & affect appropriate  Musculuskeletal: No edema    recent labs, Imaging and EKGs personally reviewed                           11.6   7.93  )-----------( 146      ( 22 Jun 2025 05:10 )             37.2               06-22    136  |  97[L]  |  18  ----------------------------<  125[H]  4.4   |  24  |  0.74    Ca    9.7      22 Jun 2025 05:10  Phos  3.9     06-22  Mg     1.90     06-22    TPro  7.8  /  Alb  4.2  /  TBili  0.4  /  DBili  x   /  AST  28  /  ALT  26  /  AlkPhos  116  06-22                       Urinalysis Basic - ( 22 Jun 2025 05:10 )    Color: x / Appearance: x / SG: x / pH: x  Gluc: 125 mg/dL / Ketone: x  / Bili: x / Urobili: x   Blood: x / Protein: x / Nitrite: x   Leuk Esterase: x / RBC: x / WBC x   Sq Epi: x / Non Sq Epi: x / Bacteria: x                  
Name of Patient : NATALIA PELAYO  MRN: [id number]  Date of visit: 06-21-25       Subjective: Patient seen and examined. No new events except as noted.   doing okay     REVIEW OF SYSTEMS:    CONSTITUTIONAL: No weakness, fevers or chills  EYES/ENT: No visual changes;  No vertigo or throat pain   NECK: No pain or stiffness  RESPIRATORY: No cough, wheezing, hemoptysis; No shortness of breath  CARDIOVASCULAR: No chest pain or palpitations  GASTROINTESTINAL: No abdominal or epigastric pain. No nausea, vomiting, or hematemesis; No diarrhea or constipation. No melena or hematochezia.  GENITOURINARY: No dysuria, frequency or hematuria  NEUROLOGICAL: No numbness or weakness  SKIN: No itching, burning, rashes, or lesions   All other review of systems is negative unless indicated above.    MEDICATIONS:  MEDICATIONS  (STANDING):  allopurinol 300 milliGRAM(s) Oral daily  artificial  tears Solution 1 Drop(s) Both EYES two times a day  aspirin enteric coated 81 milliGRAM(s) Oral daily  atorvastatin 10 milliGRAM(s) Oral at bedtime  chlorhexidine 2% Cloths 1 Application(s) Topical daily  dextrose 5%. 1000 milliLiter(s) (100 mL/Hr) IV Continuous <Continuous>  dextrose 5%. 1000 milliLiter(s) (50 mL/Hr) IV Continuous <Continuous>  dextrose 50% Injectable 25 Gram(s) IV Push once  dextrose 50% Injectable 12.5 Gram(s) IV Push once  dextrose 50% Injectable 25 Gram(s) IV Push once  enoxaparin Injectable 40 milliGRAM(s) SubCutaneous every 24 hours  glucagon  Injectable 1 milliGRAM(s) IntraMuscular once  hydrochlorothiazide 12.5 milliGRAM(s) Oral daily  insulin lispro (ADMELOG) corrective regimen sliding scale   SubCutaneous three times a day before meals  insulin lispro (ADMELOG) corrective regimen sliding scale   SubCutaneous at bedtime  pantoprazole    Tablet 40 milliGRAM(s) Oral before breakfast  pregabalin 75 milliGRAM(s) Oral daily  valsartan 80 milliGRAM(s) Oral daily      PHYSICAL EXAM:  T(C): 36.7 (06-21-25 @ 21:00), Max: 37.1 (06-21-25 @ 07:50)  HR: 68 (06-21-25 @ 21:00) (61 - 70)  BP: 136/72 (06-21-25 @ 21:00) (136/72 - 180/63)  RR: 16 (06-21-25 @ 21:00) (16 - 18)  SpO2: 100% (06-21-25 @ 21:00) (100% - 100%)  Wt(kg): --  I&O's Summary    Height (cm): 165.1 (06-20 @ 23:25)  Weight (kg): 73.9 (06-20 @ 23:25)  BMI (kg/m2): 27.1 (06-20 @ 23:25)  BSA (m2): 1.81 (06-20 @ 23:25)    Appearance: Normal	  HEENT:  PERRLA   Lymphatic: No lymphadenopathy   Cardiovascular: Normal S1 S2, no JVD  Respiratory: normal effort , clear  Gastrointestinal:  Soft, Non-tender  Skin: No rashes,  warm to touch  Psychiatry:  Mood & affect appropriate  Musculuskeletal: No edema    recent labs, Imaging and EKGs personally reviewed                           10.6   7.45  )-----------( 150      ( 21 Jun 2025 04:50 )             32.5               06-21    137  |  102  |  18  ----------------------------<  120[H]  4.4   |  21[L]  |  0.70    Ca    9.2      21 Jun 2025 04:50  Phos  3.9     06-21  Mg     1.80     06-21    TPro  7.0  /  Alb  4.0  /  TBili  0.2  /  DBili  x   /  AST  27  /  ALT  20  /  AlkPhos  116  06-19    PT/INR - ( 19 Jun 2025 23:09 )   PT: 11.2 sec;   INR: 0.97 ratio         PTT - ( 19 Jun 2025 23:09 )  PTT:29.9 sec                   Urinalysis Basic - ( 21 Jun 2025 04:50 )    Color: x / Appearance: x / SG: x / pH: x  Gluc: 120 mg/dL / Ketone: x  / Bili: x / Urobili: x   Blood: x / Protein: x / Nitrite: x   Leuk Esterase: x / RBC: x / WBC x   Sq Epi: x / Non Sq Epi: x / Bacteria: x

## 2025-06-24 NOTE — PROGRESS NOTE ADULT - PROBLEM/PLAN-3
DISPLAY PLAN FREE TEXT
Inadequate Oral Intake

## 2025-06-24 NOTE — PROGRESS NOTE ADULT - PROBLEM SELECTOR PLAN 2
-c/w home ASA  - statin  BP control   ischemic W/U

## 2025-06-24 NOTE — PROGRESS NOTE ADULT - PROBLEM SELECTOR PLAN 4
-Home medication:  Simvastatin 20 mg QHS  -c/w formulary atorvastatin 10 mg QHS  -DASH/TLC Diet

## 2025-06-24 NOTE — PROGRESS NOTE ADULT - ASSESSMENT
70F w/ HTN, HLD, T2DM, hx of CAD, here for chest pain and dyspnea. Cardiology consulted in the ER for cp and dyspnea episode. EKG reviewed, NSR no sig STT changes.     -EKG NSR no sig STT changes. troponin negativ  -TTE ECHO, normal EF, diastolic dysfunction, mild VHD   -s/p NST mild ischemia, plan for [facility] 6/23  -cont asa 81 mg qd   -cont hctz 12.5 mg qd   -cont valsartan 80 mg qd  -monitor on tele     Joel Hannah MD [facility]  Attending Interventional Cardiologist, [facility]/[place].   Avaliable on [employer] Team  
70F w/ PMHX of HTN, HLD, T2DM, CAD, here for chest pain and dyspnea. NST notable for mild to moderate defects in the inferolateral and anteroseptal walls that are reversible suggestive of ischemia. Admitted to medicine for further management and monitoring. Detailed plan as below:  
70F w/ HTN, HLD, T2DM, hx of CAD, here for chest pain and dyspnea. Cardiology consulted in the ER for cp and dyspnea episode. EKG reviewed, NSR no sig STT changes.     -EKG NSR no sig STT changes. troponin negativ  -TTE ECHO, normal EF, diastolic dysfunction, mild VHD   -s/p NST mild ischemia, plan for [facility] 6/23  -cont asa 81 mg qd   -monitor on tele     Joel Hannah MD [facility]  Attending Interventional Cardiologist, [place].   Avaliable on [employer] Team  
70F w/ PMHX of HTN, HLD, T2DM, CAD, here for chest pain and dyspnea. NST notable for mild to moderate defects in the inferolateral and anteroseptal walls that are reversible suggestive of ischemia. Admitted to medicine for further management and monitoring. Detailed plan as below:

## 2025-06-24 NOTE — PROGRESS NOTE ADULT - PROBLEM SELECTOR PLAN 1
::Typical chest pain without troponinemia; ECG with no ischemic changes; Hemodynamically stable. No evidence of volume overload or shock on exam. Low suspicion ACS, PE, PTX, aortic dissection, PNA, cardiac effusion or tamponade.  -Troponin: <6 x2  -CXR: No acute cardiopulmonary process.   -ESR 13, CRP <3  -TTE: LVEF 67%. Grade 1 DD. Mild MR.   -Admit to Telemetry; cardiac monitor  -s/p  mg x1   -ASA & Statin   -Repeat ECG in the AM  -TSH pending, A1c 6.8, LDL 56  -Avoid NSAIDs except for ASA to limit risk of cardiovascular events  -Cardiology consulted. Appreciate Recs  -Nuclear stress test: There are small-sized, mild to moderate defect(s) in the inferolateral and anteroseptal walls that are reversible suggestive of ischemia.   -S/p Cath, no stent placed, medical therapy
::Typical chest pain without troponinemia; ECG with no ischemic changes; Hemodynamically stable. No evidence of volume overload or shock on exam. Low suspicion ACS, PE, PTX, aortic dissection, PNA, cardiac effusion or tamponade.  -Troponin: <6 x2  -CXR: No acute cardiopulmonary process.   -ESR 13, CRP <3  -TTE: LVEF 67%. Grade 1 DD. Mild MR.   -Admit to Telemetry; cardiac monitor  -s/p  mg x1   -ASA & Statin   -Repeat ECG in the AM  -TSH pending, A1c 6.8, LDL 56  -Avoid NSAIDs except for ASA to limit risk of cardiovascular events  -Cardiology consulted. Appreciate Recs  -Nuclear stress test: There are small-sized, mild to moderate defect(s) in the inferolateral and anteroseptal walls that are reversible suggestive of ischemia.   -Planned for cath as per cardiology
::Typical chest pain without troponinemia; ECG with no ischemic changes; Hemodynamically stable. No evidence of volume overload or shock on exam. Low suspicion ACS, PE, PTX, aortic dissection, PNA, cardiac effusion or tamponade.  -Troponin: <6 x2  -CXR: No acute cardiopulmonary process.   -ESR 13, CRP <3  -TTE: LVEF 67%. Grade 1 DD. Mild MR.   -Admit to Telemetry; cardiac monitor  -s/p  mg x1   -ASA & Statin   -Repeat ECG in the AM  -TSH pending, A1c 6.8, LDL 56  -Avoid NSAIDs except for ASA to limit risk of cardiovascular events  -Cardiology consulted. Appreciate Recs  -Nuclear stress test: There are small-sized, mild to moderate defect(s) in the inferolateral and anteroseptal walls that are reversible suggestive of ischemia.   -Planned for cath as per cardiology on monday
::Typical chest pain without troponinemia; ECG with no ischemic changes; Hemodynamically stable. No evidence of volume overload or shock on exam. Low suspicion ACS, PE, PTX, aortic dissection, PNA, cardiac effusion or tamponade.  -Troponin: <6 x2  -CXR: No acute cardiopulmonary process.   -ESR 13, CRP <3  -TTE: LVEF 67%. Grade 1 DD. Mild MR.   -Admit to Telemetry; cardiac monitor  -s/p  mg x1   -ASA & Statin   -Repeat ECG in the AM  -TSH pending, A1c 6.8, LDL 56  -Avoid NSAIDs except for ASA to limit risk of cardiovascular events  -Cardiology consulted. Appreciate Recs  -Nuclear stress test: There are small-sized, mild to moderate defect(s) in the inferolateral and anteroseptal walls that are reversible suggestive of ischemia.   -Planned for cath as per cardiology on monday